# Patient Record
Sex: MALE | Race: ASIAN | NOT HISPANIC OR LATINO | URBAN - METROPOLITAN AREA
[De-identification: names, ages, dates, MRNs, and addresses within clinical notes are randomized per-mention and may not be internally consistent; named-entity substitution may affect disease eponyms.]

---

## 2017-06-11 ENCOUNTER — APPOINTMENT (OUTPATIENT)
Dept: RADIOLOGY | Facility: MEDICAL CENTER | Age: 77
DRG: 197 | End: 2017-06-11
Attending: EMERGENCY MEDICINE
Payer: MEDICARE

## 2017-06-11 ENCOUNTER — HOSPITAL ENCOUNTER (INPATIENT)
Facility: MEDICAL CENTER | Age: 77
LOS: 2 days | DRG: 197 | End: 2017-06-14
Attending: EMERGENCY MEDICINE | Admitting: INTERNAL MEDICINE
Payer: MEDICARE

## 2017-06-11 ENCOUNTER — RESOLUTE PROFESSIONAL BILLING HOSPITAL PROF FEE (OUTPATIENT)
Dept: HOSPITALIST | Facility: MEDICAL CENTER | Age: 77
End: 2017-06-11
Payer: MEDICARE

## 2017-06-11 DIAGNOSIS — J84.9 INTERSTITIAL LUNG DISEASE (HCC): Chronic | ICD-10-CM

## 2017-06-11 LAB
ALBUMIN SERPL BCP-MCNC: 4 G/DL (ref 3.2–4.9)
ALBUMIN/GLOB SERPL: 1.4 G/DL
ALP SERPL-CCNC: 81 U/L (ref 30–99)
ALT SERPL-CCNC: 22 U/L (ref 2–50)
ANION GAP SERPL CALC-SCNC: 7 MMOL/L (ref 0–11.9)
APTT PPP: 36.7 SEC (ref 24.7–36)
AST SERPL-CCNC: 25 U/L (ref 12–45)
BASOPHILS # BLD AUTO: 0.4 % (ref 0–1.8)
BASOPHILS # BLD: 0.02 K/UL (ref 0–0.12)
BILIRUB SERPL-MCNC: 1.4 MG/DL (ref 0.1–1.5)
BNP SERPL-MCNC: 1087 PG/ML (ref 0–100)
BUN SERPL-MCNC: 16 MG/DL (ref 8–22)
CALCIUM SERPL-MCNC: 9.4 MG/DL (ref 8.5–10.5)
CHLORIDE SERPL-SCNC: 104 MMOL/L (ref 96–112)
CO2 SERPL-SCNC: 26 MMOL/L (ref 20–33)
CREAT SERPL-MCNC: 0.89 MG/DL (ref 0.5–1.4)
DEPRECATED D DIMER PPP IA-ACNC: 549 NG/ML(D-DU)
EKG IMPRESSION: NORMAL
EKG IMPRESSION: NORMAL
EOSINOPHIL # BLD AUTO: 0.31 K/UL (ref 0–0.51)
EOSINOPHIL NFR BLD: 6.9 % (ref 0–6.9)
ERYTHROCYTE [DISTWIDTH] IN BLOOD BY AUTOMATED COUNT: 45.5 FL (ref 35.9–50)
GFR SERPL CREATININE-BSD FRML MDRD: >60 ML/MIN/1.73 M 2
GLOBULIN SER CALC-MCNC: 2.9 G/DL (ref 1.9–3.5)
GLUCOSE SERPL-MCNC: 122 MG/DL (ref 65–99)
HCT VFR BLD AUTO: 37.8 % (ref 42–52)
HGB BLD-MCNC: 12.5 G/DL (ref 14–18)
IMM GRANULOCYTES # BLD AUTO: 0.01 K/UL (ref 0–0.11)
IMM GRANULOCYTES NFR BLD AUTO: 0.2 % (ref 0–0.9)
INR PPP: 1.25 (ref 0.87–1.13)
LIPASE SERPL-CCNC: 37 U/L (ref 11–82)
LYMPHOCYTES # BLD AUTO: 1.02 K/UL (ref 1–4.8)
LYMPHOCYTES NFR BLD: 22.6 % (ref 22–41)
MCH RBC QN AUTO: 30.2 PG (ref 27–33)
MCHC RBC AUTO-ENTMCNC: 33.1 G/DL (ref 33.7–35.3)
MCV RBC AUTO: 91.3 FL (ref 81.4–97.8)
MONOCYTES # BLD AUTO: 0.39 K/UL (ref 0–0.85)
MONOCYTES NFR BLD AUTO: 8.6 % (ref 0–13.4)
NEUTROPHILS # BLD AUTO: 2.76 K/UL (ref 1.82–7.42)
NEUTROPHILS NFR BLD: 61.3 % (ref 44–72)
NRBC # BLD AUTO: 0 K/UL
NRBC BLD AUTO-RTO: 0 /100 WBC
PLATELET # BLD AUTO: 102 K/UL (ref 164–446)
PMV BLD AUTO: 10.1 FL (ref 9–12.9)
POTASSIUM SERPL-SCNC: 3.8 MMOL/L (ref 3.6–5.5)
PROCALCITONIN SERPL-MCNC: <0.05 NG/ML
PROT SERPL-MCNC: 6.9 G/DL (ref 6–8.2)
PROTHROMBIN TIME: 16.1 SEC (ref 12–14.6)
RBC # BLD AUTO: 4.14 M/UL (ref 4.7–6.1)
SODIUM SERPL-SCNC: 137 MMOL/L (ref 135–145)
TROPONIN I SERPL-MCNC: 0.03 NG/ML (ref 0–0.04)
TROPONIN I SERPL-MCNC: 0.06 NG/ML (ref 0–0.04)
TROPONIN I SERPL-MCNC: 0.07 NG/ML (ref 0–0.04)
WBC # BLD AUTO: 4.5 K/UL (ref 4.8–10.8)

## 2017-06-11 PROCEDURE — 700111 HCHG RX REV CODE 636 W/ 250 OVERRIDE (IP): Performed by: INTERNAL MEDICINE

## 2017-06-11 PROCEDURE — 71010 DX-CHEST-LIMITED (1 VIEW): CPT

## 2017-06-11 PROCEDURE — 93005 ELECTROCARDIOGRAM TRACING: CPT | Performed by: INTERNAL MEDICINE

## 2017-06-11 PROCEDURE — 85610 PROTHROMBIN TIME: CPT

## 2017-06-11 PROCEDURE — 85379 FIBRIN DEGRADATION QUANT: CPT

## 2017-06-11 PROCEDURE — A9270 NON-COVERED ITEM OR SERVICE: HCPCS | Performed by: INTERNAL MEDICINE

## 2017-06-11 PROCEDURE — 83690 ASSAY OF LIPASE: CPT

## 2017-06-11 PROCEDURE — 80053 COMPREHEN METABOLIC PANEL: CPT

## 2017-06-11 PROCEDURE — 700101 HCHG RX REV CODE 250: Performed by: INTERNAL MEDICINE

## 2017-06-11 PROCEDURE — 36415 COLL VENOUS BLD VENIPUNCTURE: CPT

## 2017-06-11 PROCEDURE — 94760 N-INVAS EAR/PLS OXIMETRY 1: CPT

## 2017-06-11 PROCEDURE — 84145 PROCALCITONIN (PCT): CPT

## 2017-06-11 PROCEDURE — 93306 TTE W/DOPPLER COMPLETE: CPT

## 2017-06-11 PROCEDURE — 99220 PR INITIAL OBSERVATION CARE,LEVL III: CPT | Mod: GC | Performed by: INTERNAL MEDICINE

## 2017-06-11 PROCEDURE — G0378 HOSPITAL OBSERVATION PER HR: HCPCS

## 2017-06-11 PROCEDURE — 84484 ASSAY OF TROPONIN QUANT: CPT

## 2017-06-11 PROCEDURE — 85730 THROMBOPLASTIN TIME PARTIAL: CPT

## 2017-06-11 PROCEDURE — 93005 ELECTROCARDIOGRAM TRACING: CPT

## 2017-06-11 PROCEDURE — 304562 HCHG STAT O2 MASK/CANNULA

## 2017-06-11 PROCEDURE — 700102 HCHG RX REV CODE 250 W/ 637 OVERRIDE(OP): Performed by: INTERNAL MEDICINE

## 2017-06-11 PROCEDURE — 93010 ELECTROCARDIOGRAM REPORT: CPT | Performed by: INTERNAL MEDICINE

## 2017-06-11 PROCEDURE — 93306 TTE W/DOPPLER COMPLETE: CPT | Mod: 26 | Performed by: INTERNAL MEDICINE

## 2017-06-11 PROCEDURE — 304561 HCHG STAT O2

## 2017-06-11 PROCEDURE — 83880 ASSAY OF NATRIURETIC PEPTIDE: CPT

## 2017-06-11 PROCEDURE — 85025 COMPLETE CBC W/AUTO DIFF WBC: CPT

## 2017-06-11 PROCEDURE — 94640 AIRWAY INHALATION TREATMENT: CPT

## 2017-06-11 PROCEDURE — 99285 EMERGENCY DEPT VISIT HI MDM: CPT

## 2017-06-11 PROCEDURE — 93005 ELECTROCARDIOGRAM TRACING: CPT | Performed by: EMERGENCY MEDICINE

## 2017-06-11 RX ORDER — PREDNISONE 20 MG/1
40 TABLET ORAL DAILY
Status: DISCONTINUED | OUTPATIENT
Start: 2017-06-12 | End: 2017-06-13

## 2017-06-11 RX ORDER — PANTOPRAZOLE SODIUM 40 MG/1
40 TABLET, DELAYED RELEASE ORAL DAILY
COMMUNITY

## 2017-06-11 RX ORDER — ACETAMINOPHEN 325 MG/1
650 TABLET ORAL EVERY 6 HOURS PRN
Status: DISCONTINUED | OUTPATIENT
Start: 2017-06-11 | End: 2017-06-14 | Stop reason: HOSPADM

## 2017-06-11 RX ORDER — AMOXICILLIN 250 MG
2 CAPSULE ORAL 2 TIMES DAILY
Status: DISCONTINUED | OUTPATIENT
Start: 2017-06-11 | End: 2017-06-14 | Stop reason: HOSPADM

## 2017-06-11 RX ORDER — IPRATROPIUM BROMIDE AND ALBUTEROL SULFATE 2.5; .5 MG/3ML; MG/3ML
3 SOLUTION RESPIRATORY (INHALATION)
Status: DISCONTINUED | OUTPATIENT
Start: 2017-06-11 | End: 2017-06-11

## 2017-06-11 RX ORDER — CLOPIDOGREL BISULFATE 75 MG/1
75 TABLET ORAL DAILY
COMMUNITY

## 2017-06-11 RX ORDER — POLYETHYLENE GLYCOL 3350 17 G/17G
1 POWDER, FOR SOLUTION ORAL
Status: DISCONTINUED | OUTPATIENT
Start: 2017-06-11 | End: 2017-06-14 | Stop reason: HOSPADM

## 2017-06-11 RX ORDER — BENZONATATE 100 MG/1
100 CAPSULE ORAL 3 TIMES DAILY PRN
Status: DISCONTINUED | OUTPATIENT
Start: 2017-06-11 | End: 2017-06-14 | Stop reason: HOSPADM

## 2017-06-11 RX ORDER — IPRATROPIUM BROMIDE AND ALBUTEROL SULFATE 2.5; .5 MG/3ML; MG/3ML
3 SOLUTION RESPIRATORY (INHALATION) EVERY 4 HOURS
Status: DISCONTINUED | OUTPATIENT
Start: 2017-06-11 | End: 2017-06-12

## 2017-06-11 RX ORDER — CARVEDILOL 6.25 MG/1
6.25 TABLET ORAL DAILY
COMMUNITY

## 2017-06-11 RX ORDER — BISACODYL 10 MG
10 SUPPOSITORY, RECTAL RECTAL
Status: DISCONTINUED | OUTPATIENT
Start: 2017-06-11 | End: 2017-06-14 | Stop reason: HOSPADM

## 2017-06-11 RX ORDER — FLUTICASONE PROPIONATE 110 UG/1
2 AEROSOL, METERED RESPIRATORY (INHALATION)
Status: DISCONTINUED | OUTPATIENT
Start: 2017-06-11 | End: 2017-06-12

## 2017-06-11 RX ORDER — LOSARTAN POTASSIUM 25 MG/1
25 TABLET ORAL DAILY
COMMUNITY

## 2017-06-11 RX ORDER — ATORVASTATIN CALCIUM 80 MG/1
80 TABLET, FILM COATED ORAL NIGHTLY
Status: DISCONTINUED | OUTPATIENT
Start: 2017-06-11 | End: 2017-06-14 | Stop reason: HOSPADM

## 2017-06-11 RX ORDER — FLUTICASONE PROPIONATE 50 MCG
2 SPRAY, SUSPENSION (ML) NASAL DAILY
Status: DISCONTINUED | OUTPATIENT
Start: 2017-06-11 | End: 2017-06-14 | Stop reason: HOSPADM

## 2017-06-11 RX ORDER — ATORVASTATIN CALCIUM 80 MG/1
80 TABLET, FILM COATED ORAL NIGHTLY
COMMUNITY

## 2017-06-11 RX ORDER — ATORVASTATIN CALCIUM 20 MG/1
80 TABLET, FILM COATED ORAL NIGHTLY
COMMUNITY
End: 2017-06-11

## 2017-06-11 RX ORDER — CARVEDILOL 6.25 MG/1
6.25 TABLET ORAL DAILY
Status: DISCONTINUED | OUTPATIENT
Start: 2017-06-11 | End: 2017-06-12

## 2017-06-11 RX ORDER — LOSARTAN POTASSIUM 25 MG/1
25 TABLET ORAL DAILY
Status: DISCONTINUED | OUTPATIENT
Start: 2017-06-11 | End: 2017-06-14 | Stop reason: HOSPADM

## 2017-06-11 RX ORDER — CLOPIDOGREL BISULFATE 75 MG/1
75 TABLET ORAL DAILY
Status: DISCONTINUED | OUTPATIENT
Start: 2017-06-11 | End: 2017-06-14 | Stop reason: HOSPADM

## 2017-06-11 RX ORDER — METHYLPREDNISOLONE ACETATE 40 MG/ML
62.5 INJECTION, SUSPENSION INTRA-ARTICULAR; INTRALESIONAL; INTRAMUSCULAR; SOFT TISSUE ONCE
Status: COMPLETED | OUTPATIENT
Start: 2017-06-11 | End: 2017-06-11

## 2017-06-11 RX ORDER — PANTOPRAZOLE SODIUM 40 MG/1
40 TABLET, DELAYED RELEASE ORAL DAILY
Status: DISCONTINUED | OUTPATIENT
Start: 2017-06-11 | End: 2017-06-11

## 2017-06-11 RX ORDER — OMEPRAZOLE 20 MG/1
20 CAPSULE, DELAYED RELEASE ORAL DAILY
Status: DISCONTINUED | OUTPATIENT
Start: 2017-06-11 | End: 2017-06-14 | Stop reason: HOSPADM

## 2017-06-11 RX ADMIN — ENOXAPARIN SODIUM 40 MG: 100 INJECTION SUBCUTANEOUS at 11:03

## 2017-06-11 RX ADMIN — OMEPRAZOLE 20 MG: 20 CAPSULE, DELAYED RELEASE ORAL at 13:31

## 2017-06-11 RX ADMIN — IPRATROPIUM BROMIDE AND ALBUTEROL SULFATE 3 ML: .5; 3 SOLUTION RESPIRATORY (INHALATION) at 14:48

## 2017-06-11 RX ADMIN — FLUTICASONE PROPIONATE 220 MCG: 110 AEROSOL, METERED RESPIRATORY (INHALATION) at 21:56

## 2017-06-11 RX ADMIN — ATORVASTATIN CALCIUM 80 MG: 80 TABLET, FILM COATED ORAL at 21:38

## 2017-06-11 RX ADMIN — CARVEDILOL 6.25 MG: 6.25 TABLET, FILM COATED ORAL at 13:31

## 2017-06-11 RX ADMIN — CLOPIDOGREL 75 MG: 75 TABLET, FILM COATED ORAL at 13:31

## 2017-06-11 RX ADMIN — LOSARTAN POTASSIUM 25 MG: 25 TABLET, FILM COATED ORAL at 13:31

## 2017-06-11 RX ADMIN — METHYLPREDNISOLONE ACETATE 62 MG: 40 INJECTION, SUSPENSION INTRA-ARTICULAR; INTRALESIONAL; INTRAMUSCULAR; SOFT TISSUE at 15:28

## 2017-06-11 RX ADMIN — IPRATROPIUM BROMIDE AND ALBUTEROL SULFATE 3 ML: .5; 3 SOLUTION RESPIRATORY (INHALATION) at 21:54

## 2017-06-11 ASSESSMENT — ENCOUNTER SYMPTOMS
VOMITING: 0
SHORTNESS OF BREATH: 1
CHILLS: 0
PSYCHIATRIC NEGATIVE: 1
NAUSEA: 0
BLURRED VISION: 0
COUGH: 1
DIAPHORESIS: 0
PALPITATIONS: 0
CONSTIPATION: 0
HEADACHES: 0
DOUBLE VISION: 0
SPUTUM PRODUCTION: 0
DIARRHEA: 0
ABDOMINAL PAIN: 0
MUSCULOSKELETAL NEGATIVE: 1
DIZZINESS: 0
FEVER: 0
ORTHOPNEA: 0
HEMOPTYSIS: 0

## 2017-06-11 ASSESSMENT — LIFESTYLE VARIABLES
ALCOHOL_USE: NO
EVER_SMOKED: YES
DO YOU DRINK ALCOHOL: NO
EVER_SMOKED: YES

## 2017-06-11 ASSESSMENT — COPD QUESTIONNAIRES
DO YOU EVER COUGH UP ANY MUCUS OR PHLEGM?: NO/ONLY WITH OCCASIONAL COLDS OR INFECTIONS
HAVE YOU SMOKED AT LEAST 100 CIGARETTES IN YOUR ENTIRE LIFE: YES
DURING THE PAST 4 WEEKS HOW MUCH DID YOU FEEL SHORT OF BREATH: SOME OF THE TIME
COPD SCREENING SCORE: 6

## 2017-06-11 ASSESSMENT — PAIN SCALES - GENERAL
PAINLEVEL_OUTOF10: 0
PAINLEVEL_OUTOF10: 0

## 2017-06-11 NOTE — IP AVS SNAPSHOT
SpectrumDNA Access Code: [email or web address]  Expires: 7/14/2017 12:08 PM    SpectrumDNA  A secure, online tool to manage your health information     [employer]’s SpectrumDNA® is a secure, online tool that connects you to your personalized health information from the privacy of your home -- day or night - making it very easy for you to manage your healthcare. Once the activation process is completed, you can even access your medical information using the SpectrumDNA rafa, which is available for free in the Apple Rafa store or Google Play store.     SpectrumDNA provides the following levels of access (as shown below):   My Chart Features   [facility] Primary Care Doctor [facility]  Specialists [facility]  Urgent  Care Non-[facility]  Primary Care  Doctor   Email your healthcare team securely and privately 24/7 X X X X   Manage appointments: schedule your next appointment; view details of past/upcoming appointments X      Request prescription refills. X      View recent personal medical records, including lab and immunizations X X X X   View health record, including health history, allergies, medications X X X X   Read reports about your outpatient visits, procedures, consult and ER notes X X X X   See your discharge summary, which is a recap of your hospital and/or ER visit that includes your diagnosis, lab results, and care plan. X X       How to register for SpectrumDNA:  1. Go to  [email or web address].  2. Click on the Sign Up Now box, which takes you to the New Member Sign Up page. You will need to provide the following information:  a. Enter your SpectrumDNA Access Code exactly as it appears at the top of this page. (You will not need to use this code after you’ve completed the sign-up process. If you do not sign up before the expiration date, you must request a new code.)   b. Enter your date of birth.   c. Enter your home email address.   d. Click Submit, and follow the next screen’s instructions.  3. Create a SpectrumDNA ID. This will be your SpectrumDNA  login ID and cannot be changed, so think of one that is secure and easy to remember.  4. Create a [employer] password. You can change your password at any time.  5. Enter your Password Reset Question and Answer. This can be used at a later time if you forget your password.   6. Enter your e-mail address. This allows you to receive e-mail notifications when new information is available in [employer].  7. Click Sign Up. You can now view your health information.    For assistance activating your [employer] account, call [phone]

## 2017-06-11 NOTE — PROGRESS NOTES
Received report from RN in ER. Patient is up in walked from [facility] to [facility]. Standing weight done. He is awake and alert with no chest pain or shortness of breath noted. Informed of safety and call system. In a sinus rhythm.

## 2017-06-11 NOTE — IP AVS SNAPSHOT
" Home Care Instructions                                                                                                                  Name:Navi Morelos  Medical Record Number:[id number]  CSN: [id number]    YOB: 1940   Age: 77 y.o.  Sex: male  HT:1.676 m (5' 5.98\") WT: 69.1 kg (152 lb 5.4 oz)          Admit Date: 6/11/2017     Discharge Date:   Today's Date: 6/14/2017  Attending Doctor:  Michael Thomas M.D.                  Allergies:  Bactrim ds and Sulfa drugs            Discharge Instructions       Discharge Instructions    Discharged to home by car with relative. Discharged via wheelchair, hospital escort: Yes.  Special equipment needed: Oxygen    Be sure to schedule a follow-up appointment with your primary care doctor or any specialists as instructed.     Discharge Plan:   Influenza Vaccine Indication: Not indicated: Previously immunized this influenza season and > 8 years of age    I understand that a diet low in cholesterol, fat, and sodium is recommended for good health. Unless I have been given specific instructions below for another diet, I accept this instruction as my diet prescription.   Other diet:     Special Instructions: None    · Is patient discharged on Warfarin / Coumadin?   No     · Is patient Post Blood Transfusion?  No    Depression / Suicide Risk    As you are discharged from this [facility] facility, it is important to learn how to keep safe from harming yourself.    Recognize the warning signs:  · Abrupt changes in personality, positive or negative- including increase in energy   · Giving away possessions  · Change in eating patterns- significant weight changes-  positive or negative  · Change in sleeping patterns- unable to sleep or sleeping all the time   · Unwillingness or inability to communicate  · Depression  · Unusual sadness, discouragement and loneliness  · Talk of wanting to die  · Neglect of personal appearance   · Rebelliousness- reckless behavior  · Withdrawal from " people/activities they love  · Confusion- inability to concentrate     If you or a loved one observes any of these behaviors or has concerns about self-harm, here's what you can do:  · Talk about it- your feelings and reasons for harming yourself  · Remove any means that you might use to hurt yourself (examples: pills, rope, extension cords, firearm)  · Get professional help from the community (Mental Health, Substance Abuse, psychological counseling)  · Do not be alone:Call your Safe Contact- someone whom you trust who will be there for you.  · Call your local CRISIS HOTLINE 373-4297 or 159-120-8354  · Call your local Children's Mobile Crisis Response Team [place] (932) 997-5396 or www.iRates  · Call the toll free National Suicide Prevention Hotlines   · National Suicide Prevention Lifeline 694-966-NYYR (5777)  · [employer] Line Network 800-SUICIDE (665-0337)    Heart Failure    Heart failure means your heart has trouble pumping blood. This makes it hard for your body to work well. Heart failure is usually a long-term (chronic) condition. You must take good care of yourself and follow your doctor's treatment plan.    HOME CARE  · Take your heart medicine as told by your doctor.  ¨ Do not stop taking medicine unless your doctor tells you to.  ¨ Do not skip any dose of medicine.  ¨ Refill your medicines before they run out.  ¨ Take other medicines only as told by your doctor or pharmacist.  · Stay active if told by your doctor. The elderly and people with severe heart failure should talk with a doctor about physical activity.  · Eat heart-healthy foods. Choose foods that are without trans fat and are low in saturated fat, cholesterol, and salt (sodium). This includes fresh or frozen fruits and vegetables, fish, lean meats, fat-free or low-fat dairy foods, whole grains, and high-fiber foods. Lentils and dried peas and beans (legumes) are also good choices.  · Limit salt if told by your  doctor.  · Cook in a healthy way. Roast, grill, broil, bake, poach, steam, or stir-thurston foods.  · Limit fluids as told by your doctor.  · Weigh yourself every morning. Do this after you pee (urinate) and before you eat breakfast. Write down your weight to give to your doctor.  · Take your blood pressure and write it down if your doctor tells you to.  · Ask your doctor how to check your pulse. Check your pulse as told.  · Lose weight if told by your doctor.  · Stop smoking or chewing tobacco. Do not use gum or patches that help you quit without your doctor's approval.  · Schedule and go to doctor visits as told.  · Nonpregnant women should have no more than 1 drink a day. Men should have no more than 2 drinks a day. Talk to your doctor about drinking alcohol.  · Stop illegal drug use.  · Stay current with shots (immunizations).  · Manage your health conditions as told by your doctor.  · Learn to manage your stress.  · Rest when you are tired.  · If it is really hot outside:  ¨ Avoid intense activities.  ¨ Use air conditioning or fans, or get in a cooler place.  ¨ Avoid caffeine and alcohol.  ¨ Wear loose-fitting, lightweight, and light-colored clothing.  · If it is really cold outside:  ¨ Avoid intense activities.  ¨ Layer your clothing.  ¨ Wear mittens or gloves, a hat, and a scarf when going outside.  ¨ Avoid alcohol.  · Learn about heart failure and get support as needed.  · Get help to maintain or improve your quality of life and your ability to care for yourself as needed.  GET HELP IF:   · You gain weight quickly.  · You are more short of breath than usual.  · You cannot do your normal activities.  · You tire easily.  · You cough more than normal, especially with activity.  · You have any or more puffiness (swelling) in areas such as your hands, feet, ankles, or belly (abdomen).  · You cannot sleep because it is hard to breathe.  · You feel like your heart is beating fast (palpitations).  · You get dizzy or  light-headed when you stand up.  GET HELP RIGHT AWAY IF:   · You have trouble breathing.  · There is a change in mental status, such as becoming less alert or not being able to focus.  · You have chest pain or discomfort.  · You faint.  MAKE SURE YOU:   · Understand these instructions.  · Will watch your condition.  · Will get help right away if you are not doing well or get worse.     This information is not intended to replace advice given to you by your health care provider. Make sure you discuss any questions you have with your health care provider.     Document Released: 09/26/2009 Document Revised: 01/08/2016 Document Reviewed: 02/03/2014  [employer] Interactive Patient Education ©2016 [employer]        Follow-up Information     1. Follow up with Julia Marks DO (This is an associate of your PCP). Go on 6/15/2017.    Why:  Your appointment time is at 11:40am, however it is requested that you arrive 15 minutes early for parking and registration    Contact information    [address] [place] [phone]         Discharge Medication Instructions:    Below are the medications your physician expects you to take upon discharge:    Review all your home medications and newly ordered medications with your doctor and/or pharmacist. Follow medication instructions as directed by your doctor and/or pharmacist.    Please keep your medication list with you and share with your physician.               Medication List      START taking these medications        Instructions    Morning Afternoon Evening Bedtime    benzonatate 100 MG Caps   Commonly known as:  TESSALON   Next Dose Due:  As needed for cough          Take 1 Cap by mouth 3 times a day as needed for Cough.   Dose:  100 mg                        doxycycline 100 MG Tabs   Commonly known as:  VIBRAMYCIN   Next Dose Due:  This evening          Take 1 Tab by mouth 2 times a day for 5 days.   Dose:  100 mg                         predniSONE 10 MG Tabs   Last time this was given:  30 mg on 6/14/2017  8:20 AM   Commonly known as:  DELTASONE   Next Dose Due:  Take two tabs tomorrow morning 6/15        Take 1 Tab by mouth every day for 5 days. On 6/14 take 3 tabs On 6/15 take 2 tabs On 6/16 take 1 tab On 6/17 take 1/2 tab On 6/18 take 1/2 tab   Dose:  10 mg                          CONTINUE taking these medications        Instructions    Morning Afternoon Evening Bedtime    atorvastatin 80 MG tablet   Last time this was given:  80 mg on 6/13/2017  8:40 PM   Commonly known as:  LIPITOR   Next Dose Due:  This evening          Take 80 mg by mouth every evening.   Dose:  80 mg                        carvedilol 6.25 MG Tabs   Last time this was given:  3.125 mg on 6/14/2017  8:20 AM   Commonly known as:  COREG   Next Dose Due:  1/2 tab this evening        Take 6.25 mg by mouth every day. Take 1/2 tab in am and 1/2 tab at night   Dose:  6.25 mg                        clopidogrel 75 MG Tabs   Last time this was given:  75 mg on 6/14/2017  8:20 AM   Commonly known as:  PLAVIX   Next Dose Due:  Tomorrow morning        Take 75 mg by mouth every day.   Dose:  75 mg                        losartan 25 MG Tabs   Last time this was given:  25 mg on 6/14/2017  8:20 AM   Commonly known as:  COZAAR   Next Dose Due:  Tomorrow morning        Take 25 mg by mouth every day.   Dose:  25 mg                        pantoprazole 40 MG Tbec   Commonly known as:  PROTONIX   Next Dose Due:  Tomorrow morning        Take 40 mg by mouth every day.   Dose:  40 mg                             Where to Get Your Medications      Information about where to get these medications is not yet available     ! Ask your nurse or doctor about these medications    - benzonatate 100 MG Caps  - doxycycline 100 MG Tabs  - predniSONE 10 MG Tabs            Orders for after discharge     DME O2 New Set Up    Complete by:  As directed              Instructions           Diet / Nutrition:    Follow  any diet instructions given to you by your doctor or the dietician, including how much salt (sodium) you are allowed each day.    If you are overweight, talk to your doctor about a weight reduction plan.    Activity:    Remain physically active following your doctor's instructions about exercise and activity.    Rest often.     Any time you become even a little tired or short of breath, SIT DOWN and rest.    Worsening Symptoms:    Report any of the following signs and symptoms to the doctor's office immediately:    *Pain of jaw, arm, or neck  *Chest pain not relieved by medication                               *Dizziness or loss of consciousness  *Difficulty breathing even when at rest   *More tired than usual                                       *Bleeding drainage or swelling of surgical site  *Swelling of feet, ankles, legs or stomach                 *Fever (>100ºF)  *Pink or blood tinged sputum  *Weight gain (3lbs/day or 5lbs /week)           *Shock from internal defibrillator (if applicable)  *Palpitations or irregular heartbeats                *Cool and/or numb extremities    Stroke Awareness    Common Risk Factors for Stroke include:    Age  Atrial Fibrillation  Carotid Artery Stenosis  Diabetes Mellitus  Excessive alcohol consumption  High blood pressure  Overweight   Physical inactivity  Smoking    Warning signs and symptoms of a stroke include:    *Sudden numbness or weakness of the face, arm or leg (especially on one side of the body).  *Sudden confusion, trouble speaking or understanding.  *Sudden trouble seeing in one or both eyes.  *Sudden trouble walking, dizziness, loss of balance or coordination.Sudden severe headache with no known cause.    It is very important to get treatment quickly when a stroke occurs. If you experience any of the above warning signs, call 911 immediately.                   Disclaimer         Quit Smoking / Tobacco Use:    I understand the use of any tobacco products increases  my chance of suffering from future heart disease or stroke and could cause other illnesses which may shorten my life. Quitting the use of tobacco products is the single most important thing I can do to improve my health. For further information on smoking / tobacco cessation call a Toll Free Quit Line at 1-107.744.5482 (*National Cancer [place]) or 1-350.153.6255 (American Lung Association) or you can access the web based program at www.lungusa.org.    [place] Tobacco Users Help Line:  (147) 754-5249       Toll Free: 1-914.542.1906  Quit Tobacco Program [facility] (583)736-7008    Crisis Hotline:    [place] Crisis Hotline:  3-483-HRSMAOH or 1-288.977.9011    [place] Crisis Hotline:    1-932.233.9271 or 705-225-4555    Discharge Survey:   Thank you for choosing [facility]. We hope we did everything we could to make your hospital stay a pleasant one. You may be receiving a phone survey and we would appreciate your time and participation in answering the questions. Your input is very valuable to us in our efforts to improve our service to our patients and their families.        My signature on this form indicates that:    1. I have reviewed and understand the above information.  2. My questions regarding this information have been answered to my satisfaction.  3. I have formulated a plan with my discharge nurse to obtain my prescribed medications for home.                  Disclaimer         __________________________________                     __________       ________                       Patient Signature                                                 Date                    Time

## 2017-06-11 NOTE — ED PROVIDER NOTES
".  ED Provider Note    Scribed for Ovidio Saunders D.O. by Jessy Kulkarni. 6/11/2017  6:24 AM    Primary care provider: No primary care provider on file.  Means of arrival: EMS  History obtained from: Patient   History limited by: None    CHIEF COMPLAINT  Chief Complaint   Patient presents with   • Chest Pain     descibed as tightness that started after arrriving in [place] from [place]   • Shortness of Breath     exsertional SOB   • Cough     dry cough       HPI  Navi Morelos is a 77 y.o. male who presents to the Emergency Department for shortness of breath, onset last night and worsening this morning. Ambulating exacerbates his shortness of breath. The patient also states his chest is \"tight\" but denies any chest pain. He also has a dry cough associated.  He denies any leg swelling or fever associated.The patient is currently visiting [place] from [place]. The patient has history of AAA, bronchitises, and CHF.      REVIEW OF SYSTEMS  Pertinent positives include shortness of breath, dry cough, chest pressure. Pertinent negatives include no chest pain, leg swelling, fever.  All other systems reviewed and negative.  C.    PAST MEDICAL HISTORY  Past Medical History   Diagnosis Date   • Hypertension    • MI (myocardial infarction) (CMS-HCC)    • Aortic aneurysm (CMS-HCC)      abdominal       SURGICAL HISTORY  Past Surgical History   Procedure Laterality Date   • Other cardiac surgery          SOCIAL HISTORY  Social History   Substance Use Topics   • Smoking status: Former Smoker   • Smokeless tobacco: None   • Alcohol Use: No      History   Drug Use No       FAMILY HISTORY  History reviewed. No pertinent family history.    CURRENT MEDICATIONS  No current facility-administered medications on file prior to encounter.     No current outpatient prescriptions on file prior to encounter.       ALLERGIES  Allergies   Allergen Reactions   • Bactrim Ds    • Sulfa Drugs        PHYSICAL EXAM  VITAL SIGNS: /84 " "mmHg  Pulse 93  Temp(Src) 37.1 °C (98.8 °F)  Resp 18  Ht 1.676 m (5' 6\")  Wt 68.947 kg (152 lb)  BMI 24.55 kg/m2  SpO2 96%    Nursing notes and vitals reviewed.  Constitutional: Well developed, Well nourished, No acute distress, Non-toxic appearance. Nasal canula in place.  Eyes: PERRLA, EOMI, Conjunctiva normal, No discharge.   Cardiovascular: Normal heart rate, Normal rhythm, No murmurs, No rubs, No gallops.   Thorax & Lungs: No respiratory distress, No wheezing, No chest tenderness. Rales and rhonchi bilaterally.  Abdomen: Bowel sounds normal, Soft, No tenderness, No guarding, No rebound, No masses, No pulsatile masses.   Skin: Warm, Dry, No erythema, No rash.   Musculoskeletal: Intact distal pulses, No edema, No cyanosis, No clubbing. Good range of motion in all major joints. No tenderness to palpation or major deformities noted, no CVA tenderness, no midline back tenderness.   Neurologic: Alert & oriented x 3, Normal motor function, Normal sensory function, No focal deficits noted.  Psychiatric: Affect normal for clinical presentation.    DIAGNOSTIC STUDIES/PROCEDURES    LABS  Results for orders placed or performed during the hospital encounter of 06/11/17   Troponin   Result Value Ref Range    Troponin I 0.03 0.00 - 0.04 ng/mL   Btype Natriuretic Peptide   Result Value Ref Range    B Natriuretic Peptide 1087 (H) 0 - 100 pg/mL   CBC with Differential   Result Value Ref Range    WBC 4.5 (L) 4.8 - 10.8 K/uL    RBC 4.14 (L) 4.70 - 6.10 M/uL    Hemoglobin 12.5 (L) 14.0 - 18.0 g/dL    Hematocrit 37.8 (L) 42.0 - 52.0 %    MCV 91.3 81.4 - 97.8 fL    MCH 30.2 27.0 - 33.0 pg    MCHC 33.1 (L) 33.7 - 35.3 g/dL    RDW 45.5 35.9 - 50.0 fL    Platelet Count 102 (L) 164 - 446 K/uL    MPV 10.1 9.0 - 12.9 fL    Neutrophils-Polys 61.30 44.00 - 72.00 %    Lymphocytes 22.60 22.00 - 41.00 %    Monocytes 8.60 0.00 - 13.40 %    Eosinophils 6.90 0.00 - 6.90 %    Basophils 0.40 0.00 - 1.80 %    Immature Granulocytes 0.20 0.00 - " 0.90 %    Nucleated RBC 0.00 /100 WBC    Neutrophils (Absolute) 2.76 1.82 - 7.42 K/uL    Lymphs (Absolute) 1.02 1.00 - 4.80 K/uL    Monos (Absolute) 0.39 0.00 - 0.85 K/uL    Eos (Absolute) 0.31 0.00 - 0.51 K/uL    Baso (Absolute) 0.02 0.00 - 0.12 K/uL    Immature Granulocytes (abs) 0.01 0.00 - 0.11 K/uL    NRBC (Absolute) 0.00 K/uL   Complete Metabolic Panel (CMP)   Result Value Ref Range    Sodium 137 135 - 145 mmol/L    Potassium 3.8 3.6 - 5.5 mmol/L    Chloride 104 96 - 112 mmol/L    Co2 26 20 - 33 mmol/L    Anion Gap 7.0 0.0 - 11.9    Glucose 122 (H) 65 - 99 mg/dL    Bun 16 8 - 22 mg/dL    Creatinine 0.89 0.50 - 1.40 mg/dL    Calcium 9.4 8.5 - 10.5 mg/dL    AST(SGOT) 25 12 - 45 U/L    ALT(SGPT) 22 2 - 50 U/L    Alkaline Phosphatase 81 30 - 99 U/L    Total Bilirubin 1.4 0.1 - 1.5 mg/dL    Albumin 4.0 3.2 - 4.9 g/dL    Total Protein 6.9 6.0 - 8.2 g/dL    Globulin 2.9 1.9 - 3.5 g/dL    A-G Ratio 1.4 g/dL   Prothrombin Time   Result Value Ref Range    PT 16.1 (H) 12.0 - 14.6 sec    INR 1.25 (H) 0.87 - 1.13   APTT   Result Value Ref Range    APTT 36.7 (H) 24.7 - 36.0 sec   Lipase   Result Value Ref Range    Lipase 37 11 - 82 U/L   ESTIMATED GFR   Result Value Ref Range    GFR If African American >60 >60 mL/min/1.73 m 2    GFR If Non African American >60 >60 mL/min/1.73 m 2   EKG (ER)   Result Value Ref Range    Report       [facility] Emergency Dept.    Test Date:  2017  Pt Name:    JOVITA BEST                  Department: ER  MRN:        [id number]                      Room:        09  Gender:     M                            Technician: [id number]  :        1940                   Requested By:ER TRIAGE PROTOCOL  Order #:    [id number]                    Reading MD: GINA AMBRIZ, DO    Measurements  Intervals                                Axis  Rate:       95                           P:          62  NY:         192                          QRS:        -15  QRSD:       82                            T:          121  QT:         368  QTc:        463    Interpretive Statements  SINUS RHYTHM  EARLY PRECORDIAL R/S TRANSITION  LVH WITH SECONDARY REPOLARIZATION ABNORMALITY  INFERIOR INFARCT, OLD  No previous ECG available for comparison    Electronically Signed On 6- 6:30:17 PDT by GINA AMBRIZ, DO         All labs reviewed by me.      RADIOLOGY  DX-CHEST-LIMITED (1 VIEW)   Final Result      Interstitial prominence may represent chronic fibrotic changes. Superimposed edema or pneumonitis not excluded.      Cardiomegaly.        The radiologist's interpretation of all radiological studies have been reviewed by me.    COURSE & MEDICAL DECISION MAKING  Pertinent Labs & Imaging studies reviewed. (See chart for details)    6:24 AM - Patient seen and examined at bedside. Ordered DX-chest, Troponin, BNP, CBC with differential, CMP, Prothrombin Time, APTT, Lipase, Estimated GFR, EKG to evaluate his symptoms.     7:40 AM Paged R Internal Medicine.    7:47 AM - I discussed the patient's case and the above findings with [facility] who agrees to admit the patient.     8:12 AM Recheck: Patient re-evaluated at beside.  Discussed patient's condition and treatment plan. Patient's lab and radiology results discussed. The patient understood and is in agreement for admission.       This is a charming 77 y.o. male that presents with shortness of breath. As concern for possible CHF therefore evaluation was completed. The patient does have an elevated BNP of 1087 as well as intermittent tachycardia. At this point, the patient will be admitted for further evaluation and management of his congestive heart failure acute exacerbation. The patient is a very difficult historian yet I do not believe he has a history of congestive heart failure in the past. The patient has not severe hypoxia and do not believe is expressing a pulmonary embolism, aortic dissection, aortic aneurysm. EKG reveals no  evidence of ST elevation myocardial infarction.      DISPOSITION:  Patient will be admitted to [facility] Internal in guarded condition.       FINAL IMPRESSION  Congestive heart failure  Shortness of breath   IJessy (Scribe), am scribing for, and in the presence of, Ovidio Saunders D.O    Electronically signed by: Jessy Kulkarni (Scribe), 6/11/2017    IOvidio D.O. personally performed the services described in this documentation, as scribed by Jessy Kulkarni in my presence, and it is both accurate and complete.    The note accurately reflects work and decisions made by me.  Ovidio Saunders  6/11/2017  9:46 AM

## 2017-06-11 NOTE — IP AVS SNAPSHOT
" <p align=\"LEFT\"><IMG SRC=\"//[email or web address]$/[email or web address]\" alt=\"Image\" WIDTH=\"50%\" HEIGHT=\"200\" BORDER=\"\"></p>                   Name:Navi Morelos  Medical Record Number:[id number]  CSN: [id number]    YOB: 1940   Age: 77 y.o.  Sex: male  HT:1.676 m (5' 5.98\") WT: 69.1 kg (152 lb 5.4 oz)          Admit Date: 6/11/2017     Discharge Date:   Today's Date: 6/14/2017  Attending Doctor:  Michael Thomas M.D.                  Allergies:  Bactrim ds and Sulfa drugs          Follow-up Information     1. Follow up with Julia Marks DO (This is an associate of your PCP). Go on 6/15/2017.    Why:  Your appointment time is at 11:40am, however it is requested that you arrive 15 minutes early for parking and registration    Contact information    [address] [place], [place] [address] [phone]         Medication List      Take these Medications        Instructions    atorvastatin 80 MG tablet   Commonly known as:  LIPITOR    Take 80 mg by mouth every evening.   Dose:  80 mg       benzonatate 100 MG Caps   Commonly known as:  TESSALON    Take 1 Cap by mouth 3 times a day as needed for Cough.   Dose:  100 mg       carvedilol 6.25 MG Tabs   Commonly known as:  COREG    Take 6.25 mg by mouth every day. Take 1/2 tab in am and 1/2 tab at night   Dose:  6.25 mg       clopidogrel 75 MG Tabs   Commonly known as:  PLAVIX    Take 75 mg by mouth every day.   Dose:  75 mg       doxycycline 100 MG Tabs   Commonly known as:  VIBRAMYCIN    Take 1 Tab by mouth 2 times a day for 5 days.   Dose:  100 mg       losartan 25 MG Tabs   Commonly known as:  COZAAR    Take 25 mg by mouth every day.   Dose:  25 mg       pantoprazole 40 MG Tbec   Commonly known as:  PROTONIX    Take 40 mg by mouth every day.   Dose:  40 mg       predniSONE 10 MG Tabs   Commonly known as:  DELTASONE    Take 1 Tab by mouth every day for 5 days. On 6/14 take 3 tabs On 6/15 take 2 tabs On 6/16 take 1 tab On 6/17 take 1/2 tab On 6/18 take 1/2 tab   "   Dose:  10 mg

## 2017-06-11 NOTE — ASSESSMENT & PLAN NOTE
-Per patient's son, patient had history of GI bleed in past and was put on Protonix since then  -Stable, no evidence of acute bleed, no melena  -Continue omeprazole 20mg qd while here

## 2017-06-11 NOTE — ED NOTES
"Wife at bedside. She states pt has chronic \"lung problems\" but unsure what the condition is called. Pt has a regular pulmonologist at home.   "

## 2017-06-11 NOTE — ASSESSMENT & PLAN NOTE
-h/o multiple MIs, s/p 2 stents in 2013  -Troponin 0.03->0.06-->0.07, mild increase likely secondary to demand ischemia.  -ECG: Sinus, , old inferior infarct, LVH, no ST/T changes. Repeat ECG x 2 no changes.  -home meds: clopidogrel 75mg, coreg 6.25mg, lipitor 80mg. Continue here

## 2017-06-11 NOTE — H&P
"       [facility] Admitting History and Physical    Name Navi Morelos 1940   Age/Sex 77 y.o. male   MRN [id number]   Code Status Full Code     After 5PM or if no immediate response to page, please call for cross-coverage  Attending/Team: Dr. Grubbs/Jori Call [phone] to page   1st Call - Day Intern (R1):   Dr. Perdomo 2nd Call - Day Sr. Resident (R2/R3):   Dr. Uribe       Chief Complaint:  SOB, chest tightness, cough    HPI:  77y M with PMH of HTN, CAD with last MI 2 years ago, chronic bronchiectasis/ILD presents today with \"chest tightness\", dry cough, and SOB. Patient is visiting from [place], primarily [place] speaking so history is somewhat limited with the language barrier. States he arrived in [place] on Friday and has [place]. He started to develop a dry cough, believes it is due to the smoke exposure in the casino. Last night he developed a chest tightness, pointing to the substernal/midepigastric region, with associated SOB. Pain is non radiating, denies nausea, vomiting, diaphoresis, palpitations, leg swelling, orthopnea. Denies recent illness or changes in medications.    ED Course:  CXR, troponin, ECG, lipase, and basic labs were evaluated.  No meds started.     Review of Systems   Constitutional: Negative for fever, chills, malaise/fatigue and diaphoresis.   HENT: Negative for hearing loss.    Eyes: Negative for blurred vision and double vision.   Respiratory: Positive for cough and shortness of breath. Negative for hemoptysis and sputum production.    Cardiovascular: Negative for chest pain, palpitations, orthopnea and leg swelling.        Described as \"chest tightness\"   Gastrointestinal: Negative for nausea, vomiting, abdominal pain, diarrhea and constipation.   Genitourinary: Negative for dysuria, urgency, frequency and hematuria.   Musculoskeletal: Negative.    Neurological: Negative for dizziness and headaches. "   Endo/Heme/Allergies: Negative.    Psychiatric/Behavioral: Negative.              Past Medical History:   Past Medical History   Diagnosis Date   • Hypertension    • MI (myocardial infarction) (CMS-HCC)    • Aortic aneurysm (CMS-HCC)      abdominal   Chronic bronchiectasis, Interstitial Lung Disease    Past Surgical History:  Past Surgical History   Procedure Laterality Date   • Other cardiac surgery     AAA repair 2012    Current Outpatient Medications:  Home Medications     Reviewed by Dane Garrison (Pharmacy Tech) on 06/11/17 at 0748  Med List Status: Complete    Medication Last Dose Status    atorvastatin (LIPITOR) 80 MG tablet 6/10/2017 Active    carvedilol (COREG) 6.25 MG Tab 6/10/2017 Active    clopidogrel (PLAVIX) 75 MG Tab 6/10/2017 Active    losartan (COZAAR) 25 MG Tab 6/10/2017 Active    pantoprazole (PROTONIX) 40 MG Tablet Delayed Response 6/10/2017 Active                Medication Allergy/Sensitivities:  Allergies   Allergen Reactions   • Bactrim Ds    • Sulfa Drugs    -Sulfa drugs: rash      Family History:  History reviewed. No pertinent family history.    Social History:  Social History     Social History   • Marital Status:      Spouse Name: N/A   • Number of Children: N/A   • Years of Education: N/A     Occupational History   • Not on file.     Social History Main Topics   • Smoking status: Former Smoker   • Smokeless tobacco: Not on file   • Alcohol Use: No   • Drug Use: No   • Sexual Activity: Not on file     Other Topics Concern   • Not on file     Social History Narrative   • No narrative on file     Social hx: 20 pack year history, quit 30 years ago    Living situation: Lives in [place] with wife.  PCP : established with [facility]      Physical Exam     Filed Vitals:    06/11/17 0915 06/11/17 0933 06/11/17 1310 06/11/17 1450   BP:  117/77 116/73    Pulse: 95 93 83 75   Temp:  37.3 °C (99.2 °F) 37.2 °C (98.9 °F)    Resp: 20 19  16   Height:       Weight:       SpO2:  "90% 92% 93% 97%     Body mass index is 25.1 kg/(m^2).  /73 mmHg  Pulse 75  Temp(Src) 37.2 °C (98.9 °F)  Resp 16  Ht 1.676 m (5' 5.98\")  Wt 70.5 kg (155 lb 6.8 oz)  BMI 25.10 kg/m2  SpO2 97%  O2 therapy: Pulse Oximetry: 97 %, O2 (LPM): 2, O2 Delivery: Silicone Nasal Cannula    Physical Exam   Constitutional: He is oriented to person, place, and time and well-developed, well-nourished, and in no distress.   HENT:   Head: Normocephalic and atraumatic.   Mouth/Throat: Oropharynx is clear and moist.   Eyes: EOM are normal.   Neck: Normal range of motion. JVD present.   Cardiovascular: Normal rate and regular rhythm.  Exam reveals no friction rub.    No murmur heard.  Split S2 heard best in LSB.   Pulmonary/Chest: Effort normal. No respiratory distress. He has no wheezes. He has rales.   Bilateral crackles heard throughout, loudest in the bibasilar areas.   Abdominal: Soft. Bowel sounds are normal. He exhibits no distension and no mass. There is no tenderness. There is no guarding.   Musculoskeletal: Normal range of motion. He exhibits no edema.   Neurological: He is alert and oriented to person, place, and time.   Skin: Skin is warm and dry. He is not diaphoretic. No erythema.   Psychiatric: Affect normal.             Data Review       Old Records Request:   Completed  Current Records review and summary: Completed    Lab Data Review:  Recent Results (from the past 24 hour(s))   EKG (ER)    Collection Time: 17  6:07 AM   Result Value Ref Range    Report       [facility] Dept[facility]    Test Date:  2017  Pt Name:    JOVITA BEST                  Department: ER  MRN:        [id number]                      Room:       RD 09  Gender:     M                            Technician: [id number]  :        1940                   Requested By:ER TRIAGE PROTOCOL  Order #:    [id number]                    Mitch MD: GINA AMBRIZ, DO    Measurements  Intervals                        "         Axis  Rate:       95                           P:          62  ND:         192                          QRS:        -15  QRSD:       82                           T:          121  QT:         368  QTc:        463    Interpretive Statements  SINUS RHYTHM  EARLY PRECORDIAL R/S TRANSITION  LVH WITH SECONDARY REPOLARIZATION ABNORMALITY  INFERIOR INFARCT, OLD  No previous ECG available for comparison    Electronically Signed On 6- 6:30:17 PDT by GINA AMBRIZ DO     Troponin    Collection Time: 06/11/17  6:20 AM   Result Value Ref Range    Troponin I 0.03 0.00 - 0.04 ng/mL   Btype Natriuretic Peptide    Collection Time: 06/11/17  6:20 AM   Result Value Ref Range    B Natriuretic Peptide 1087 (H) 0 - 100 pg/mL   CBC with Differential    Collection Time: 06/11/17  6:20 AM   Result Value Ref Range    WBC 4.5 (L) 4.8 - 10.8 K/uL    RBC 4.14 (L) 4.70 - 6.10 M/uL    Hemoglobin 12.5 (L) 14.0 - 18.0 g/dL    Hematocrit 37.8 (L) 42.0 - 52.0 %    MCV 91.3 81.4 - 97.8 fL    MCH 30.2 27.0 - 33.0 pg    MCHC 33.1 (L) 33.7 - 35.3 g/dL    RDW 45.5 35.9 - 50.0 fL    Platelet Count 102 (L) 164 - 446 K/uL    MPV 10.1 9.0 - 12.9 fL    Neutrophils-Polys 61.30 44.00 - 72.00 %    Lymphocytes 22.60 22.00 - 41.00 %    Monocytes 8.60 0.00 - 13.40 %    Eosinophils 6.90 0.00 - 6.90 %    Basophils 0.40 0.00 - 1.80 %    Immature Granulocytes 0.20 0.00 - 0.90 %    Nucleated RBC 0.00 /100 WBC    Neutrophils (Absolute) 2.76 1.82 - 7.42 K/uL    Lymphs (Absolute) 1.02 1.00 - 4.80 K/uL    Monos (Absolute) 0.39 0.00 - 0.85 K/uL    Eos (Absolute) 0.31 0.00 - 0.51 K/uL    Baso (Absolute) 0.02 0.00 - 0.12 K/uL    Immature Granulocytes (abs) 0.01 0.00 - 0.11 K/uL    NRBC (Absolute) 0.00 K/uL   Complete Metabolic Panel (CMP)    Collection Time: 06/11/17  6:20 AM   Result Value Ref Range    Sodium 137 135 - 145 mmol/L    Potassium 3.8 3.6 - 5.5 mmol/L    Chloride 104 96 - 112 mmol/L    Co2 26 20 - 33 mmol/L    Anion Gap 7.0 0.0 - 11.9     Glucose 122 (H) 65 - 99 mg/dL    Bun 16 8 - 22 mg/dL    Creatinine 0.89 0.50 - 1.40 mg/dL    Calcium 9.4 8.5 - 10.5 mg/dL    AST(SGOT) 25 12 - 45 U/L    ALT(SGPT) 22 2 - 50 U/L    Alkaline Phosphatase 81 30 - 99 U/L    Total Bilirubin 1.4 0.1 - 1.5 mg/dL    Albumin 4.0 3.2 - 4.9 g/dL    Total Protein 6.9 6.0 - 8.2 g/dL    Globulin 2.9 1.9 - 3.5 g/dL    A-G Ratio 1.4 g/dL   Prothrombin Time    Collection Time: 17  6:20 AM   Result Value Ref Range    PT 16.1 (H) 12.0 - 14.6 sec    INR 1.25 (H) 0.87 - 1.13   APTT    Collection Time: 17  6:20 AM   Result Value Ref Range    APTT 36.7 (H) 24.7 - 36.0 sec   Lipase    Collection Time: 17  6:20 AM   Result Value Ref Range    Lipase 37 11 - 82 U/L   ESTIMATED GFR    Collection Time: 17  6:20 AM   Result Value Ref Range    GFR If African American >60 >60 mL/min/1.73 m 2    GFR If Non African American >60 >60 mL/min/1.73 m 2   D-DIMER    Collection Time: 17  6:20 AM   Result Value Ref Range    D-Dimer Screen 549 (H) <250 ng/mL(D-DU)   EKG (ER)    Collection Time: 17 12:07 PM   Result Value Ref Range    Report       [facility]    Test Date:  2017  Pt Name:    JOVITA BEST                  Department: ER  MRN:        [id number]                      Room:       T734  Gender:     M                            Technician: PAULINE  :        1940                   Requested By:GINA AMBRIZ  Order #:    [id number]                    Reading MD:    Measurements  Intervals                                Axis  Rate:       83                           P:          13  PA:         176                          QRS:        -14  QRSD:       74                           T:          126  QT:         392  QTc:        461    Interpretive Statements  SINUS RHYTHM  EARLY PRECORDIAL R/S TRANSITION  LVH WITH SECONDARY REPOLARIZATION ABNORMALITY  INFERIOR INFARCT, OLD  Compared to ECG 2017 06:07:05  No significant changes     TROPONIN     "Collection Time: 06/11/17  1:00 PM   Result Value Ref Range    Troponin I 0.06 (H) 0.00 - 0.04 ng/mL       Imaging/Procedures Review:    Independant Imaging Review: Completed  DX-CHEST-LIMITED (1 VIEW)   Final Result      Interstitial prominence may represent chronic fibrotic changes. Superimposed edema or pneumonitis not excluded.      Cardiomegaly.      ECHOCARDIOGRAM COMP W/O CONT    (Results Pending)        EKG:   EKG Independant Review: Completed  QTc: 463, HR: 95, Normal Sinus Rhythm, no ST/T changes, old inferior infarct, LVH.    Records reviewed and summarized in current documentation             Assessment/Plan     Shortness of breath (present on admission)  Assessment & Plan  -1 day history of SOB, chest \"tightness\", 3 day history of dry cough  -likely 2/2 acute exacerbation of chronic ILD due to change in environment, smoke exposure  -must r/o CHF exacerbation: history of MIs, no ECHO on file. Appears to be euvolemic with no leg swelling, slight JVD so less likely  -BNP 1087 but likely 2/2 R heart strain from chronic lung disease  -PE less likely: D-dimer 587 but Wells score 1.5 so low likelihood  -must r/o ACS: normal trop, ECG with no acute ST/T changes, less likely   Plan:   -pending ECHO   -trend troponin, ECG   -RT protocol   -O2 supplement per protocol   -IV methylprednisolone, restart home inhalers, tessalon for cough    Coronary artery disease (present on admission)  Assessment & Plan  -h/o multiple MIs, s/p 2 stents in 2013  -Troponin 0.03->0.06, mild increase likely secondary to demand ischemia. Continue trend troponin's/EKG  -ECG: Sinus, , old inferior infarct, LVH, no ST/T changes. Repeat ECG no changes.  -home meds: clopidogrel 75mg, coreg 6.25mg, lipitor 80mg. Continue here    Interstitial lung disease (CMS-HCC) (present on admission)  Assessment & Plan  -[facility]s [facility] Pulmonology at [facility]  -no O2 at home per patient  -home meds: dexamethasone 1.5mg, combivent rescue inhaler, " qvar    Hypertension  Assessment & Plan  -Stable on admission.  -Home meds: losartan 25mg. Continue here    History of GI bleed  Assessment & Plan  -Per patient's son, patient had history of GI bleed in past and was put on Protonix since then  -Stable, no evidence of acute bleed, no melena  -Continue protonix 40mg here          Anticipated Hospital stay: Observation admit        Quality Measures  EKG reviewed, Labs reviewed, Medications reviewed and Radiology images reviewed  Escalante catheter: No Escalante        DVT prophylaxis - mechanical: SCDs  Ulcer prophylaxis: Yes

## 2017-06-11 NOTE — ED NOTES
Med rec complete per S/O at bedside  Allergies reviewed  No ORAL antibiotics in last 30 days    Patient did take a medrol dose pack a couple weeks ago per S/O    Pharmacy is in [facility]

## 2017-06-11 NOTE — ASSESSMENT & PLAN NOTE
"-1 day history of SOB, chest \"tightness\", 3 day history of dry cough  -likely 2/2 acute exacerbation of chronic ILD due to change in environment, smoke exposure  -must r/o CHF exacerbation: history of HFrEF on [facility] records. Appears to be euvolemic with no leg swelling, slight JVD so less likely.  -2014 Cath: Severe cardiomyopathy with LVEF 30%, hypokinesis of entire inferior wall.  -ECHO here: LVEF 25%, grade 2 diastolic dysfunction, RVSP 60  -BNP 1087, per chart review patient had BNPs up to 999, so this may be his baseline from chronic heart strain with HF and ILD  -PE less likely: D-dimer 587 but Wells score 1.5 so low likelihood  -ACS less likely: slight trop elevations 2/2 demand ischemia, ECG x 3 with no acute ST/T changes   Plan:   -2-view CXR to assess if has pulm edema from possible HF exacerbation   -RT protocol   -O2 supplement per protocol   -prednisone 40mg PO, flovent, duoneb   -home O2 eval  "

## 2017-06-11 NOTE — ED NOTES
"Navi Morelos  77 y.o.  Chief Complaint   Patient presents with   • Chest Pain     descibed as tightness that started after arrriving in [place] from [place]   • Shortness of Breath     exsertional SOB   • Cough     dry cough       BIB EMS from [facility] for above complaint. No edema noted. Lung sounds with rales throughout. 89% RA. Hx MI. Taking Plavix. Pt A-fib on monitor. Denies current CP/SOB. PTA received nitro which relieved CP. EKG done. /84 mmHg  Pulse 93  Temp(Src) 37.1 °C (98.8 °F)  Resp 18  Ht 1.676 m (5' 6\")  Wt 68.947 kg (152 lb)  BMI 24.55 kg/m2  SpO2 96% on 2L.     "

## 2017-06-11 NOTE — IP AVS SNAPSHOT
6/14/2017    Navi Morelos  [address]  [facility] [address]    Dear Navi:    [facility] wants to ensure your discharge home is safe and you or your loved ones have had all of your questions answered regarding your care after you leave the hospital.    Below is a list of resources and contact information should you have any questions regarding your hospital stay, follow-up instructions, or active medical symptoms.    Questions or Concerns Regarding… Contact   Medical Questions Related to Your Discharge  (7 days a week, 8am-5pm) Contact a Nurse Care Coordinator   [phone]   Medical Questions Not Related to Your Discharge  (24 hours a day / 7 days a week)  Contact the Nurse Health Line   [phone]    Medications or Discharge Instructions Refer to your discharge packet   or contact your [facility] Primary Care Provider   [phone]   Follow-up Appointment(s) Schedule your appointment via [employer]   or contact Scheduling [phone]   Billing Review your statement via [employer]  or contact Billing [phone]   Medical Records Review your records via [employer]   or contact Medical Records [phone]     You may receive a telephone call within two days of discharge. This call is to make certain you understand your discharge instructions and have the opportunity to have any questions answered. You can also easily access your medical information, test results and upcoming appointments via the [employer] free online health management tool. You can learn more and sign up at [employer]. For assistance setting up your [employer] account, please call [phone].    Once again, we want to ensure your discharge home is safe and that you have a clear understanding of any next steps in your care. If you have any questions or concerns, please do not hesitate to contact us, we are here for you. Thank you for choosing [facility] for your healthcare needs.    Sincerely,    Your [facility] Healthcare Team

## 2017-06-11 NOTE — ASSESSMENT & PLAN NOTE
-follows with Pulmonology at [facility], last seen 05/2017  -PFTs 2014: FEV1 95%, FEV1/FVC: 78, TLC 77%, DLCO 58%  -no O2 at home per patient  -home meds: medrol dose seng as needed, qvar daily, combivent as rescue

## 2017-06-12 ENCOUNTER — APPOINTMENT (OUTPATIENT)
Dept: RADIOLOGY | Facility: MEDICAL CENTER | Age: 77
DRG: 197 | End: 2017-06-12
Attending: INTERNAL MEDICINE
Payer: MEDICARE

## 2017-06-12 LAB
ANION GAP SERPL CALC-SCNC: 9 MMOL/L (ref 0–11.9)
BASOPHILS # BLD AUTO: 0.5 % (ref 0–1.8)
BASOPHILS # BLD: 0.02 K/UL (ref 0–0.12)
BUN SERPL-MCNC: 16 MG/DL (ref 8–22)
CALCIUM SERPL-MCNC: 9.1 MG/DL (ref 8.5–10.5)
CHLORIDE SERPL-SCNC: 103 MMOL/L (ref 96–112)
CO2 SERPL-SCNC: 27 MMOL/L (ref 20–33)
CREAT SERPL-MCNC: 0.91 MG/DL (ref 0.5–1.4)
EKG IMPRESSION: NORMAL
EOSINOPHIL # BLD AUTO: 0.28 K/UL (ref 0–0.51)
EOSINOPHIL NFR BLD: 6.8 % (ref 0–6.9)
ERYTHROCYTE [DISTWIDTH] IN BLOOD BY AUTOMATED COUNT: 45 FL (ref 35.9–50)
GFR SERPL CREATININE-BSD FRML MDRD: >60 ML/MIN/1.73 M 2
GLUCOSE SERPL-MCNC: 120 MG/DL (ref 65–99)
HCT VFR BLD AUTO: 36.7 % (ref 42–52)
HGB BLD-MCNC: 12.1 G/DL (ref 14–18)
IMM GRANULOCYTES # BLD AUTO: 0.01 K/UL (ref 0–0.11)
IMM GRANULOCYTES NFR BLD AUTO: 0.2 % (ref 0–0.9)
LV EJECT FRACT MOD 2C 99903: 29.82
LV EJECT FRACT MOD 4C 99902: 26.34
LV EJECT FRACT MOD BP 99901: 32.38
LYMPHOCYTES # BLD AUTO: 0.87 K/UL (ref 1–4.8)
LYMPHOCYTES NFR BLD: 21.1 % (ref 22–41)
MCH RBC QN AUTO: 30.2 PG (ref 27–33)
MCHC RBC AUTO-ENTMCNC: 33 G/DL (ref 33.7–35.3)
MCV RBC AUTO: 91.5 FL (ref 81.4–97.8)
MONOCYTES # BLD AUTO: 0.46 K/UL (ref 0–0.85)
MONOCYTES NFR BLD AUTO: 11.1 % (ref 0–13.4)
NEUTROPHILS # BLD AUTO: 2.49 K/UL (ref 1.82–7.42)
NEUTROPHILS NFR BLD: 60.3 % (ref 44–72)
NRBC # BLD AUTO: 0 K/UL
NRBC BLD AUTO-RTO: 0 /100 WBC
PLATELET # BLD AUTO: 92 K/UL (ref 164–446)
PMV BLD AUTO: 10.4 FL (ref 9–12.9)
POTASSIUM SERPL-SCNC: 4.1 MMOL/L (ref 3.6–5.5)
RBC # BLD AUTO: 4.01 M/UL (ref 4.7–6.1)
SODIUM SERPL-SCNC: 139 MMOL/L (ref 135–145)
WBC # BLD AUTO: 4.1 K/UL (ref 4.8–10.8)

## 2017-06-12 PROCEDURE — 99232 SBSQ HOSP IP/OBS MODERATE 35: CPT | Mod: GC | Performed by: INTERNAL MEDICINE

## 2017-06-12 PROCEDURE — A9270 NON-COVERED ITEM OR SERVICE: HCPCS | Performed by: INTERNAL MEDICINE

## 2017-06-12 PROCEDURE — 94640 AIRWAY INHALATION TREATMENT: CPT

## 2017-06-12 PROCEDURE — 94760 N-INVAS EAR/PLS OXIMETRY 1: CPT

## 2017-06-12 PROCEDURE — 71020 DX-CHEST-2 VIEWS: CPT

## 2017-06-12 PROCEDURE — 700102 HCHG RX REV CODE 250 W/ 637 OVERRIDE(OP): Performed by: INTERNAL MEDICINE

## 2017-06-12 PROCEDURE — 93010 ELECTROCARDIOGRAM REPORT: CPT | Performed by: INTERNAL MEDICINE

## 2017-06-12 PROCEDURE — 93005 ELECTROCARDIOGRAM TRACING: CPT | Performed by: INTERNAL MEDICINE

## 2017-06-12 PROCEDURE — 80048 BASIC METABOLIC PNL TOTAL CA: CPT

## 2017-06-12 PROCEDURE — 700111 HCHG RX REV CODE 636 W/ 250 OVERRIDE (IP): Performed by: INTERNAL MEDICINE

## 2017-06-12 PROCEDURE — 770020 HCHG ROOM/CARE - TELE (206)

## 2017-06-12 PROCEDURE — 85025 COMPLETE CBC W/AUTO DIFF WBC: CPT

## 2017-06-12 PROCEDURE — 700101 HCHG RX REV CODE 250: Performed by: INTERNAL MEDICINE

## 2017-06-12 PROCEDURE — 36415 COLL VENOUS BLD VENIPUNCTURE: CPT

## 2017-06-12 RX ORDER — CARVEDILOL 3.12 MG/1
3.12 TABLET ORAL 2 TIMES DAILY WITH MEALS
Status: DISCONTINUED | OUTPATIENT
Start: 2017-06-12 | End: 2017-06-14 | Stop reason: HOSPADM

## 2017-06-12 RX ORDER — IPRATROPIUM BROMIDE AND ALBUTEROL SULFATE 2.5; .5 MG/3ML; MG/3ML
3 SOLUTION RESPIRATORY (INHALATION)
Status: DISCONTINUED | OUTPATIENT
Start: 2017-06-12 | End: 2017-06-14 | Stop reason: HOSPADM

## 2017-06-12 RX ORDER — FLUTICASONE PROPIONATE 110 UG/1
2 AEROSOL, METERED RESPIRATORY (INHALATION) EVERY 12 HOURS
Status: DISCONTINUED | OUTPATIENT
Start: 2017-06-12 | End: 2017-06-14 | Stop reason: HOSPADM

## 2017-06-12 RX ADMIN — FLUTICASONE PROPIONATE 220 MCG: 110 AEROSOL, METERED RESPIRATORY (INHALATION) at 21:00

## 2017-06-12 RX ADMIN — LOSARTAN POTASSIUM 25 MG: 25 TABLET, FILM COATED ORAL at 08:20

## 2017-06-12 RX ADMIN — CARVEDILOL 6.25 MG: 6.25 TABLET, FILM COATED ORAL at 08:20

## 2017-06-12 RX ADMIN — IPRATROPIUM BROMIDE AND ALBUTEROL SULFATE 3 ML: .5; 3 SOLUTION RESPIRATORY (INHALATION) at 02:19

## 2017-06-12 RX ADMIN — FLUTICASONE PROPIONATE 100 MCG: 50 SPRAY, METERED NASAL at 08:20

## 2017-06-12 RX ADMIN — FLUTICASONE PROPIONATE 220 MCG: 110 AEROSOL, METERED RESPIRATORY (INHALATION) at 07:12

## 2017-06-12 RX ADMIN — ATORVASTATIN CALCIUM 80 MG: 80 TABLET, FILM COATED ORAL at 21:25

## 2017-06-12 RX ADMIN — CLOPIDOGREL 75 MG: 75 TABLET, FILM COATED ORAL at 08:20

## 2017-06-12 RX ADMIN — PREDNISONE 40 MG: 20 TABLET ORAL at 08:20

## 2017-06-12 RX ADMIN — CARVEDILOL 3.12 MG: 3.12 TABLET, FILM COATED ORAL at 18:02

## 2017-06-12 RX ADMIN — IPRATROPIUM BROMIDE AND ALBUTEROL SULFATE 3 ML: .5; 3 SOLUTION RESPIRATORY (INHALATION) at 07:10

## 2017-06-12 ASSESSMENT — ENCOUNTER SYMPTOMS
SHORTNESS OF BREATH: 1
ORTHOPNEA: 0
NAUSEA: 0
ABDOMINAL PAIN: 0
PSYCHIATRIC NEGATIVE: 1
SPUTUM PRODUCTION: 0
CONSTIPATION: 0
FEVER: 0
DIARRHEA: 0
PALPITATIONS: 0
COUGH: 1
MYALGIAS: 0
BLOOD IN STOOL: 0
DIZZINESS: 0
HEADACHES: 0
VOMITING: 0
CHILLS: 0
DIAPHORESIS: 0

## 2017-06-12 ASSESSMENT — PAIN SCALES - GENERAL
PAINLEVEL_OUTOF10: 0
PAINLEVEL_OUTOF10: 0

## 2017-06-12 ASSESSMENT — LIFESTYLE VARIABLES: DO YOU DRINK ALCOHOL: NO

## 2017-06-12 NOTE — PROGRESS NOTES
Handoff report received. Assume patient care. Patient sitting up in bed. Wife at bedside. Patient not in pain. Patient not in distress, AAOX 4. Bed alarm is on. Safety precautions in place. Call light and personal belongings within reach. Educated to call for assistance if needed.

## 2017-06-12 NOTE — PROGRESS NOTES
Bedside report received from night RN.  Pt assessed A&Ox4, no c/o pain, pt states no sob but fine crackles noted in bases.  POC discussed with pt, all questions answered.  Pt states all needs are met.  Bed strip alarm on, bed in lowest position, call light within reach.  Will continue to monitor

## 2017-06-12 NOTE — ASSESSMENT & PLAN NOTE
-2014 Cath: Severe cardiomyopathy with LVEF 30%, hypokinesis of entire inferior wall.  -ECHO here: LVEF 25%, Grade 2 diastolic dysfunction, RVSP 60.  -BNP here 1087, previous has been 500-999  -Appears euvolemic  -Continuing home meds: coreg 6.25mg, losartan 25mg

## 2017-06-12 NOTE — PROGRESS NOTES
Received call from Dr Barajas concerning echo results.  Read back to Dr Barajas with results.  Will update Dr Perdomo.

## 2017-06-12 NOTE — PROGRESS NOTES
"                               [facility] Interval Note    Name Navi Morelos 1940   Age/Sex 77 y.o. male   MRN [id number]   Code Status Full     After 5PM or if no immediate response to page, please call for cross-coverage  Attending/Team: Dr. Thomas/Jori Call [phone] to page   1st Call - Day Intern (R1):   Dr. Perdomo 2nd Call - Day Sr. Resident (R2/R3):   Dr. Uribe         Chief complaint/ reason for interval visit (Primary Diagnosis)   Acute shortness of breath in the setting of interstitial lung disease after being exposed to cigarette smoke in [employer]    Interval Problem Daily Status Update      Active Problems:      Shortness of breath POA: Yes. S/p methylprednisolone IM x1, flovent, duoneb. Improvement in symptoms with decreased SOB and decreased chest \"tightness\". Ambulates with O2, no exacerbation of symptoms.        Coronary artery disease (Chronic) POA: Yes. ECG and trops negative for ACS. Denies CP, palpitations, N/V, diaphoresis. Stable on home meds.      Interstitial lung disease (CMS-HCC) (Chronic) POA: Yes. Restarted on similar home regimen. Currently on O2 supplement.       Hypertension (Chronic) POA: Yes. Stable, currently on home meds.      History of GI bleed: Stable, no bleeding, currently on omeprazole 20mg.      Heart failure with reduced ejection fraction (CMS-HCC) (Chronic) POA: Unknown. ECHO here shows similar EF (25% vs 30% in 2014), appears euvolemic. Stable on home meds.    Resolved Problems:    * No resolved hospital problems. *      Review of Systems   Constitutional: Negative for fever, chills, malaise/fatigue and diaphoresis.   Respiratory: Positive for cough and shortness of breath. Negative for sputum production.         SOB is improved   Cardiovascular: Negative for chest pain, palpitations, orthopnea and leg swelling.        \"chest tightness\" is decreased   Gastrointestinal: Negative for nausea, vomiting, abdominal pain, diarrhea, constipation and " blood in stool.   Genitourinary: Negative for dysuria, urgency, frequency and hematuria.   Musculoskeletal: Negative for myalgias.   Neurological: Negative for dizziness and headaches.   Psychiatric/Behavioral: Negative.        Consultants/Specialty  None    Disposition  Inpatient for further medical stabilization including O2 requirements.    Quality Measures  EKG reviewed, Labs reviewed, Medications reviewed and Radiology images reviewed  Escalante catheter: No Escalante        DVT prophylaxis - mechanical: SCDs  Ulcer prophylaxis: Yes              Physical Exam       Filed Vitals:    06/12/17 0220 06/12/17 0400 06/12/17 0712 06/12/17 0820   BP:  105/68  134/77   Pulse: 78 79 77 96   Temp:  36.2 °C (97.2 °F)  36.2 °C (97.2 °F)   Resp: 16 17 18 18   Height:       Weight:       SpO2: 97% 96% 95% 98%     Body mass index is 25.28 kg/(m^2). Weight: 71 kg (156 lb 8.4 oz)  Oxygen Therapy:  Pulse Oximetry: 98 %, O2 (LPM): 2, O2 Delivery: Silicone Nasal Cannula    Physical Exam   Constitutional: He is oriented to person, place, and time and well-developed, well-nourished, and in no distress.   HENT:   Head: Normocephalic and atraumatic.   Eyes: EOM are normal.   Neck: Normal range of motion.   Cardiovascular: Normal rate, regular rhythm, normal heart sounds and intact distal pulses.    No murmur heard.  Pulmonary/Chest: Effort normal. No respiratory distress. He has no wheezes.   Improved air entry in bilateral upper lobes, bibasilar crackles present.   Abdominal: Soft. Bowel sounds are normal. He exhibits no distension. There is no tenderness.   Musculoskeletal: Normal range of motion. He exhibits no edema.   Neurological: He is alert and oriented to person, place, and time.   Skin: Skin is warm and dry.   Psychiatric: Affect normal.         Lab Data Review:      6/12/2017  1:18 PM    Recent Labs      06/11/17   0620  06/12/17   0340   SODIUM  137  139   POTASSIUM  3.8  4.1   CHLORIDE  104  103   CO2  26  27   BUN  16  16  "  CREATININE  0.89  0.91   CALCIUM  9.4  9.1       Recent Labs      06/11/17   0620  06/12/17   0340   ALTSGPT  22   --    ASTSGOT  25   --    ALKPHOSPHAT  81   --    TBILIRUBIN  1.4   --    LIPASE  37   --    GLUCOSE  122*  120*       Recent Labs      06/11/17   0620  06/12/17   0341   RBC  4.14*  4.01*   HEMOGLOBIN  12.5*  12.1*   HEMATOCRIT  37.8*  36.7*   PLATELETCT  102*  92*   PROTHROMBTM  16.1*   --    APTT  36.7*   --    INR  1.25*   --        Recent Labs      06/11/17   0620  06/12/17   0341   WBC  4.5*  4.1*   NEUTSPOLYS  61.30  60.30   LYMPHOCYTES  22.60  21.10*   MONOCYTES  8.60  11.10   EOSINOPHILS  6.90  6.80   BASOPHILS  0.40  0.50   ASTSGOT  25   --    ALTSGPT  22   --    ALKPHOSPHAT  81   --    TBILIRUBIN  1.4   --            Assessment/Plan     Shortness of breath (present on admission)  Assessment & Plan  -1 day history of SOB, chest \"tightness\", 3 day history of dry cough  -likely 2/2 acute exacerbation of chronic ILD due to change in environment, smoke exposure  -must r/o CHF exacerbation: history of HFrEF on [facility] records. Appears to be euvolemic with no leg swelling, slight JVD so less likely.  -2014 Cath: Severe cardiomyopathy with LVEF 30%, hypokinesis of entire inferior wall.  -ECHO here: LVEF 25%, grade 2 diastolic dysfunction, RVSP 60  -BNP 1087, per chart review patient had BNPs up to 999, so this may be his baseline from chronic heart strain with HF and ILD  -PE less likely: D-dimer 587 but Wells score 1.5 so low likelihood  -ACS less likely: slight trop elevations 2/2 demand ischemia, ECG x 3 with no acute ST/T changes   Plan:   -2-view CXR to assess if has pulm edema from possible HF exacerbation   -RT protocol   -O2 supplement per protocol   -prednisone 40mg PO, flovent, duoneb   -home O2 eval    Coronary artery disease (present on admission)  Assessment & Plan  -h/o multiple MIs, s/p 2 stents in 2013  -Troponin 0.03->0.06-->0.07, mild increase likely secondary to demand " ischemia.  -ECG: Sinus, , old inferior infarct, LVH, no ST/T changes. Repeat ECG x 2 no changes.  -home meds: clopidogrel 75mg, coreg 6.25mg, lipitor 80mg. Continue here    Interstitial lung disease (CMS-HCC) (present on admission)  Assessment & Plan  -follows with Pulmonology at [facility], last seen 05/2017  -PFTs 2014: FEV1 95%, FEV1/FVC: 78, TLC 77%, DLCO 58%  -no O2 at home per patient  -home meds: medrol dose seng as needed, qvar daily, combivent as rescue    Hypertension  Assessment & Plan  -Stable on admission.  -Home meds: losartan 25mg. Continue here    History of GI bleed  Assessment & Plan  -Per patient's son, patient had history of GI bleed in past and was put on Protonix since then  -Stable, no evidence of acute bleed, no melena  -Continue omeprazole 20mg qd while here    Heart failure with reduced ejection fraction (CMS-HCC)  Assessment & Plan  -2014 Cath: Severe cardiomyopathy with LVEF 30%, hypokinesis of entire inferior wall.  -ECHO here: LVEF 25%, Grade 2 diastolic dysfunction, RVSP 60.  -BNP here 1087, previous has been 500-999  -Appears euvolemic  -Continuing home meds: coreg 6.25mg, losartan 25mg

## 2017-06-13 LAB
ALBUMIN SERPL BCP-MCNC: 3.9 G/DL (ref 3.2–4.9)
ALBUMIN/GLOB SERPL: 1.4 G/DL
ALP SERPL-CCNC: 73 U/L (ref 30–99)
ALT SERPL-CCNC: 17 U/L (ref 2–50)
ANION GAP SERPL CALC-SCNC: 8 MMOL/L (ref 0–11.9)
AST SERPL-CCNC: 17 U/L (ref 12–45)
BASOPHILS # BLD AUTO: 0.1 % (ref 0–1.8)
BASOPHILS # BLD: 0.01 K/UL (ref 0–0.12)
BILIRUB SERPL-MCNC: 1.4 MG/DL (ref 0.1–1.5)
BUN SERPL-MCNC: 20 MG/DL (ref 8–22)
CALCIUM SERPL-MCNC: 9.6 MG/DL (ref 8.5–10.5)
CHLORIDE SERPL-SCNC: 102 MMOL/L (ref 96–112)
CO2 SERPL-SCNC: 27 MMOL/L (ref 20–33)
CREAT SERPL-MCNC: 0.88 MG/DL (ref 0.5–1.4)
EOSINOPHIL # BLD AUTO: 0.09 K/UL (ref 0–0.51)
EOSINOPHIL NFR BLD: 1.2 % (ref 0–6.9)
ERYTHROCYTE [DISTWIDTH] IN BLOOD BY AUTOMATED COUNT: 43.8 FL (ref 35.9–50)
GFR SERPL CREATININE-BSD FRML MDRD: >60 ML/MIN/1.73 M 2
GLOBULIN SER CALC-MCNC: 2.8 G/DL (ref 1.9–3.5)
GLUCOSE SERPL-MCNC: 116 MG/DL (ref 65–99)
HCT VFR BLD AUTO: 38.5 % (ref 42–52)
HGB BLD-MCNC: 12.6 G/DL (ref 14–18)
IMM GRANULOCYTES # BLD AUTO: 0.04 K/UL (ref 0–0.11)
IMM GRANULOCYTES NFR BLD AUTO: 0.6 % (ref 0–0.9)
LYMPHOCYTES # BLD AUTO: 1.16 K/UL (ref 1–4.8)
LYMPHOCYTES NFR BLD: 16 % (ref 22–41)
MCH RBC QN AUTO: 29.7 PG (ref 27–33)
MCHC RBC AUTO-ENTMCNC: 32.7 G/DL (ref 33.7–35.3)
MCV RBC AUTO: 90.8 FL (ref 81.4–97.8)
MONOCYTES # BLD AUTO: 0.7 K/UL (ref 0–0.85)
MONOCYTES NFR BLD AUTO: 9.6 % (ref 0–13.4)
NEUTROPHILS # BLD AUTO: 5.27 K/UL (ref 1.82–7.42)
NEUTROPHILS NFR BLD: 72.5 % (ref 44–72)
NRBC # BLD AUTO: 0 K/UL
NRBC BLD AUTO-RTO: 0 /100 WBC
PLATELET # BLD AUTO: 121 K/UL (ref 164–446)
PMV BLD AUTO: 10.7 FL (ref 9–12.9)
POTASSIUM SERPL-SCNC: 4 MMOL/L (ref 3.6–5.5)
PROT SERPL-MCNC: 6.7 G/DL (ref 6–8.2)
RBC # BLD AUTO: 4.24 M/UL (ref 4.7–6.1)
SODIUM SERPL-SCNC: 137 MMOL/L (ref 135–145)
WBC # BLD AUTO: 7.3 K/UL (ref 4.8–10.8)

## 2017-06-13 PROCEDURE — 700111 HCHG RX REV CODE 636 W/ 250 OVERRIDE (IP): Performed by: INTERNAL MEDICINE

## 2017-06-13 PROCEDURE — A9270 NON-COVERED ITEM OR SERVICE: HCPCS | Performed by: INTERNAL MEDICINE

## 2017-06-13 PROCEDURE — 700102 HCHG RX REV CODE 250 W/ 637 OVERRIDE(OP): Performed by: INTERNAL MEDICINE

## 2017-06-13 PROCEDURE — 36415 COLL VENOUS BLD VENIPUNCTURE: CPT

## 2017-06-13 PROCEDURE — 80053 COMPREHEN METABOLIC PANEL: CPT

## 2017-06-13 PROCEDURE — 770020 HCHG ROOM/CARE - TELE (206)

## 2017-06-13 PROCEDURE — 99232 SBSQ HOSP IP/OBS MODERATE 35: CPT | Mod: GC | Performed by: INTERNAL MEDICINE

## 2017-06-13 PROCEDURE — 85025 COMPLETE CBC W/AUTO DIFF WBC: CPT

## 2017-06-13 RX ORDER — DOXYCYCLINE HYCLATE 100 MG
100 TABLET ORAL 2 TIMES DAILY
Qty: 10 TAB | Refills: 0 | Status: SHIPPED | OUTPATIENT
Start: 2017-06-13 | End: 2017-06-18

## 2017-06-13 RX ORDER — PREDNISONE 10 MG/1
10 TABLET ORAL DAILY
Qty: 7 TAB | Refills: 0 | Status: SHIPPED | OUTPATIENT
Start: 2017-06-13 | End: 2017-06-18

## 2017-06-13 RX ORDER — DOXYCYCLINE HYCLATE 100 MG
100 TABLET ORAL 2 TIMES DAILY
Qty: 10 TAB | Refills: 0 | Status: SHIPPED | OUTPATIENT
Start: 2017-06-13 | End: 2017-06-13

## 2017-06-13 RX ORDER — PREDNISONE 10 MG/1
30 TABLET ORAL DAILY
Status: DISCONTINUED | OUTPATIENT
Start: 2017-06-14 | End: 2017-06-14 | Stop reason: HOSPADM

## 2017-06-13 RX ORDER — DOXYCYCLINE 100 MG/1
100 TABLET ORAL EVERY 12 HOURS
Status: DISCONTINUED | OUTPATIENT
Start: 2017-06-13 | End: 2017-06-14 | Stop reason: HOSPADM

## 2017-06-13 RX ORDER — BENZONATATE 100 MG/1
100 CAPSULE ORAL 3 TIMES DAILY PRN
Qty: 20 CAP | Refills: 0 | Status: SHIPPED | OUTPATIENT
Start: 2017-06-13

## 2017-06-13 RX ADMIN — FLUTICASONE PROPIONATE 220 MCG: 110 AEROSOL, METERED RESPIRATORY (INHALATION) at 20:40

## 2017-06-13 RX ADMIN — CARVEDILOL 3.12 MG: 3.12 TABLET, FILM COATED ORAL at 08:12

## 2017-06-13 RX ADMIN — LOSARTAN POTASSIUM 25 MG: 25 TABLET, FILM COATED ORAL at 08:12

## 2017-06-13 RX ADMIN — PREDNISONE 40 MG: 20 TABLET ORAL at 08:13

## 2017-06-13 RX ADMIN — CARVEDILOL 3.12 MG: 3.12 TABLET, FILM COATED ORAL at 18:22

## 2017-06-13 RX ADMIN — ATORVASTATIN CALCIUM 80 MG: 80 TABLET, FILM COATED ORAL at 20:40

## 2017-06-13 RX ADMIN — FLUTICASONE PROPIONATE 100 MCG: 50 SPRAY, METERED NASAL at 08:10

## 2017-06-13 RX ADMIN — CLOPIDOGREL 75 MG: 75 TABLET, FILM COATED ORAL at 08:12

## 2017-06-13 RX ADMIN — DOXYCYCLINE 100 MG: 100 TABLET ORAL at 20:40

## 2017-06-13 RX ADMIN — FLUTICASONE PROPIONATE 220 MCG: 110 AEROSOL, METERED RESPIRATORY (INHALATION) at 08:11

## 2017-06-13 RX ADMIN — OMEPRAZOLE 20 MG: 20 CAPSULE, DELAYED RELEASE ORAL at 08:12

## 2017-06-13 ASSESSMENT — PAIN SCALES - GENERAL
PAINLEVEL_OUTOF10: 0

## 2017-06-13 NOTE — DISCHARGE PLANNING
[facility] received a DME choice form. Per the choice form the referral has been sent to [facility]

## 2017-06-13 NOTE — DISCHARGE PLANNING
Medical Social Work    SW received PC from Kenan Vaughn CM. Pt does have DME insurance benefit. Per Johana, O2 order will be sent to [facility] today. KARINA spoke with pt's son to see if someone would be available to receive equipment at home address. Son stated no one will be home until midnight tonight. SW stated [facility] may not be able to deliver at that time and pt's O2 tanks will run out if equipment isn't delivered, Son agreed pt will have to stay another night and plan for DC tomorrow early morning so that equipment can be delivered once pt arrives home. Son requested a phone call at [phone] to be informed on what time pt will dc.

## 2017-06-13 NOTE — DISCHARGE PLANNING
Medical Social Work    SW met with pt and pt's wife at bedside. Pt and pt's wife made choice of [facility] for Home O2. Family resides in [place], [place], which is near [place], [place].

## 2017-06-13 NOTE — DISCHARGE PLANNING
Medical Social Work    KARINA informed by LATONIA Negron that pt's insurance will not cover the O2 order unless his [facility]  orders it. KARINA contacted [facility] in [place], [place], at ph# [phone], and left a VM for their continuing care worker explaining above information. KARINA advised that pt cannot d/c without his home O2, so the request should be deemed urgent in nature. KARINA left both Tele 7 Care Coordination extensions and requested a call back.

## 2017-06-13 NOTE — PROGRESS NOTES
Handoff report received. Assume patient care. Patient sitting up in chair. Patient not   in pain. Patient not in distress, AAOX 4. Transport is here to take Patient to xray.

## 2017-06-13 NOTE — PROGRESS NOTES
Pt has been waiting all day for oxygen.  working with [facility]  to get the O2. Family member wants to talk to Dr. Perdomo. I have Paged him twice. Pt is AOX4, 1L of O2, VSS, SR,  Tolerates diet. Pt wants to leave.

## 2017-06-13 NOTE — PROGRESS NOTES
Pt ambulated around unit, room at still maintains 90-92% while at rest but will drop to 83-84 with ambulation
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
## 2017-06-13 NOTE — DISCHARGE PLANNING
Medical Social Work    KARINA spoke with pt's [facility] who stated she was looking to see if pt had DME benefit on his insurance plan. CM to call KARINA back with a response.

## 2017-06-13 NOTE — PROGRESS NOTES
Bedside report received. Patient A&O X 3, tele- SR 6 beats of V-tach,  2L NC,  Voids- urinal/bathroom, Activity- independent, Diet- cardiac. Complains of pain 0/10 medicated per MAR. POC discussed with patient. Pt verbalized understanding. Call light and belongings with in reach.  Bed locked and in lowest position, alarm and fall precautions in place.

## 2017-06-13 NOTE — PROGRESS NOTES
MS    SR 67-87    Frequent PACs, Rare PVCs, and 5 beats of V-tach (pt was sleeping and asymptomatic)    0.18/0.08/0.36

## 2017-06-13 NOTE — PROGRESS NOTES
No changes in pt status, AOX4,  1L NC, tele/HR - SR, voids- bathroom, tolerates cardiac diet.  0/10 Pain. Reviewed plan of care, continue to monitor vitals and manage pain. Test for O2 need performed.

## 2017-06-13 NOTE — FACE TO FACE
Face to Face Note  -  Durable Medical Equipment    Jose Perdomo M.D. - NPI: [id number]  I certify that this patient is under my care and that they had a durable medical equipment(DME)face to face encounter by myself that meets the physician DME face-to-face encounter requirements with this patient on:    Date of encounter:   Patient:                    MRN:                       YOB: 2017  Navi Morelos  [id number]  1940     The encounter with the patient was in whole, or in part, for the following medical condition, which is the primary reason for durable medical equipment:  CHF    I certify that, based on my findings, the following durable medical equipment is medically necessary:  Oxygen.    HOME O2 Saturation Measurements:(Values must be present for Home Oxygen orders)  Room air sat at rest: 91  Room air sat with amb: 84  With liters of O2: 1, O2 sat at rest with O2: 98  With Liters of O2: 2, O2 sat with amb with O2 : 91  Is the patient mobile?: Yes    My Clinical findings support the need for the above equipment due to:  Hypoxia    Supporting Symptoms: Cough, Shortness of breath

## 2017-06-13 NOTE — HEART FAILURE PROGRAM
Late Entry:    Cardiovascular Nurse Navigator () Progress Note:     This CNN discussed pt care with BSRNJunior on the morning of 6/12/17. Pt is not on Acute HF List as was not on problems list as such this CNN was not aware of him. Pt plans to return home to [place] immediately upon DC. Per Junior, he has already provided HF education and [employer] team plan to DC pt today.    This CNN contacted pt's PCP at [facility] in [place] and was able to obtain an appt with PCP's Associate Dr. Marks on 6/15. This was entered into the follow up section and will appear on the AVS.     Main [facility] provided this CNN with Cardiology group number. This CNN phoned the cardiology group and spoke with . Message was sent by  for an RN to phone back to arrange f/u appt. Advised that they phone the patient back as he is discharging.    PCP appointment is adequate for HF f/u.     PLEASE DO NOT SCHEDULE PT WITH [facility] UNLESS HE CHANGES HIS PLANS AND DECIDES TO STAY IN [place].    Thank you  Please call with questions.

## 2017-06-13 NOTE — CARE PLAN
Problem: Infection  Goal: Will remain free from infection  Intervention: Implement standard precautions and perform hand washing before and after patient contact  RN will follow protocols and necessary steps to minimize the spread of infection. RN educated pt and and any visitors on proper hand hygiene.       Problem: Knowledge Deficit  Goal: Knowledge of disease process/condition, treatment plan, diagnostic tests, and medications will improve  Intervention: Assess knowledge level of disease process/condition, treatment plan, diagnostic tests, and medications  Patient educated on plan of care, medications, pain management, and disease processes.. Will continue to answer questions regarding medication administration, care, procedures, and upcoming tests.

## 2017-06-14 VITALS
DIASTOLIC BLOOD PRESSURE: 53 MMHG | TEMPERATURE: 98.1 F | SYSTOLIC BLOOD PRESSURE: 88 MMHG | OXYGEN SATURATION: 97 % | BODY MASS INDEX: 24.48 KG/M2 | HEART RATE: 91 BPM | RESPIRATION RATE: 19 BRPM | WEIGHT: 152.34 LBS | HEIGHT: 66 IN

## 2017-06-14 PROCEDURE — 700102 HCHG RX REV CODE 250 W/ 637 OVERRIDE(OP): Performed by: STUDENT IN AN ORGANIZED HEALTH CARE EDUCATION/TRAINING PROGRAM

## 2017-06-14 PROCEDURE — A9270 NON-COVERED ITEM OR SERVICE: HCPCS | Performed by: STUDENT IN AN ORGANIZED HEALTH CARE EDUCATION/TRAINING PROGRAM

## 2017-06-14 PROCEDURE — 700111 HCHG RX REV CODE 636 W/ 250 OVERRIDE (IP): Performed by: INTERNAL MEDICINE

## 2017-06-14 PROCEDURE — 700102 HCHG RX REV CODE 250 W/ 637 OVERRIDE(OP): Performed by: INTERNAL MEDICINE

## 2017-06-14 PROCEDURE — A9270 NON-COVERED ITEM OR SERVICE: HCPCS | Performed by: INTERNAL MEDICINE

## 2017-06-14 PROCEDURE — 99239 HOSP IP/OBS DSCHRG MGMT >30: CPT | Mod: GC | Performed by: INTERNAL MEDICINE

## 2017-06-14 RX ORDER — TRAZODONE HYDROCHLORIDE 50 MG/1
50 TABLET ORAL ONCE
Status: COMPLETED | OUTPATIENT
Start: 2017-06-14 | End: 2017-06-14

## 2017-06-14 RX ADMIN — CARVEDILOL 3.12 MG: 3.12 TABLET, FILM COATED ORAL at 08:20

## 2017-06-14 RX ADMIN — LOSARTAN POTASSIUM 25 MG: 25 TABLET, FILM COATED ORAL at 08:20

## 2017-06-14 RX ADMIN — DOXYCYCLINE 100 MG: 100 TABLET ORAL at 09:49

## 2017-06-14 RX ADMIN — FLUTICASONE PROPIONATE 100 MCG: 50 SPRAY, METERED NASAL at 08:23

## 2017-06-14 RX ADMIN — TRAZODONE HYDROCHLORIDE 50 MG: 50 TABLET ORAL at 00:18

## 2017-06-14 RX ADMIN — OMEPRAZOLE 20 MG: 20 CAPSULE, DELAYED RELEASE ORAL at 08:20

## 2017-06-14 RX ADMIN — FLUTICASONE PROPIONATE 220 MCG: 110 AEROSOL, METERED RESPIRATORY (INHALATION) at 08:23

## 2017-06-14 RX ADMIN — CLOPIDOGREL 75 MG: 75 TABLET, FILM COATED ORAL at 08:20

## 2017-06-14 RX ADMIN — PREDNISONE 30 MG: 20 TABLET ORAL at 08:20

## 2017-06-14 ASSESSMENT — PAIN SCALES - GENERAL
PAINLEVEL_OUTOF10: 0

## 2017-06-14 NOTE — PROGRESS NOTES
SBP 88. DR PERDOMO PAGED TO BE MADE AWARE. PATIENT ASYMPTOMATIC. SAFETY MAINTAINED.       12:30 Dr Perdomo returned this RN page- aware of patient's most recent VS. No new orders

## 2017-06-14 NOTE — DISCHARGE PLANNING
Medical Social Work    KARINA called [employer] who stated an order has not yet been placed for pt's O2. KARINA called CARISSA Vaughn again who stated she was following up with [employer] regarding the order that was sent yesterday.

## 2017-06-14 NOTE — PROGRESS NOTES
Bedside report received. Patient care assumed. No s/s of distress. Call light within reach. Safety maintained.  SW called regarding home O2. KARINA will follow-up with [place] to check status of o2.

## 2017-06-14 NOTE — PROGRESS NOTES
Received report from Zohra RN and bedside. Pt is A&OX4 and is sitting up in bed with family. Pt verbalizing no complaints at this time. Per report pt was to be DC but home oxygen is still not set up. Pt will be kept for another night and hopefully dc in the am when oxygen is setup. Pt has non slip socks applied, bed in lowest position, and call light/belongings within reach.

## 2017-06-14 NOTE — DISCHARGE PLANNING
Medical Social Work    KARINA spoke with pt's son Dr Real and updated on O2 pending. Dr. Real became frustrated but expressed understanding regarding O2 process with [facility] pt's. Dr. Real requested update as soon as KARINA hears back from [facility] regarding O2.

## 2017-06-14 NOTE — CARE PLAN
Problem: Respiratory:  Goal: Respiratory status will improve  Outcome: PROGRESSING SLOWER THAN EXPECTED  Intervention: Assess and monitor pulmonary status    06/13/17 0800 06/13/17 2000 06/13/17 2200   OTHER   Respiration --  17 --    Pulse Oximetry --  96 % --    O2 (LPM) --  0 --    Work Of Breathing / Effort --  --  Mild   Pre/Post Intervention Pre Intervention Assessment --  --    RUL Breath Sounds --  --  Clear   RML Breath Sounds --  --  Clear   RLL Breath Sounds --  --  Fine Crackles   MARYA Breath Sounds --  --  Clear   LLL Breath Sounds --  --  Fine Crackles   Respiratory   Cough --  --  Non Productive       Intervention: Administer and titrate oxygen therapy    06/13/17 2229   OTHER   O2 (LPM) 1

## 2017-06-14 NOTE — PROGRESS NOTES
"                               [facility] Internal Medicine Interval Note    Name Navi Morelos       1940   Age/Sex 77 y.o. male   MRN [id number]   Code Status Full Code     After 5PM or if no immediate response to page, please call for cross-coverage  Attending/Team: Dr. Thomas/Jori Call [phone] to page   1st Call - Day Intern (R1):   Dr. Perdomo 2nd Call - Day Sr. Resident (R2/R3):   Dr. Uribe         Chief complaint/ reason for interval visit (Primary Diagnosis)   Acute shortness of breath in the setting of interstitial lung disease after being exposed to cigarette smoke in [facility]'s    Interval Problem Daily Status Update    Patient failed 6 minute walk test, needs home 02 order placed, D/C home tomorrow as long as he has home oxygen set up.      Shortness of breath: Stable on oxygen, denies SOB while on oxygen, failed 6 minute walking eval with desaturations down to 83-84%, attempted to call patient's pulmonologist office multiple time without success.    Coronary artery disease: No new symptoms, Stable on home meds.    Interstitial lung disease: Restarted on similar home regimen, send home on oxygen.    Hypertension: Stable, currently on home meds, some non-sustained tachycardia in 150's on ambulation.    History of GI bleed: Stable, no bleeding, currently on omeprazole 20mg.    Heart failure with reduced ejection fraction: ECHO here shows similar EF (25% vs 30% in 2014), appears euvolemic. Stable on home meds.        ROS  Constitutional: Negative for fever, chills, malaise/fatigue and diaphoresis.   Respiratory: Positive for cough and shortness of breath. Negative for sputum production.          SOB is improved   Cardiovascular: Negative for chest pain, palpitations, orthopnea and leg swelling.         \"chest tightness\" is decreased   Gastrointestinal: Negative for nausea, vomiting, abdominal pain, diarrhea, constipation and blood in stool.   Genitourinary: Negative for dysuria, urgency, frequency and " hematuria.   Musculoskeletal: Negative for myalgias.   Neurological: Negative for dizziness and headaches.   Psychiatric/Behavioral: Negative.        Consultants/Specialty  None    Disposition  Inpatient for discharge with home oxygen    Core Measures  EKG reviewed, Labs reviewed, Medications reviewed and Radiology images reviewed  Escalante catheter: No Escalante  DVT prophylaxis - mechanical: SCDs  Ulcer prophylaxis: Yes      Physical Exam       Filed Vitals:    06/13/17 0400 06/13/17 0750 06/13/17 1157 06/13/17 1611   BP: 111/66 111/68 99/60 108/72   Pulse: 73 64 80 93   Temp: 36.7 °C (98.1 °F) 36.9 °C (98.4 °F) 37 °C (98.6 °F) 37.1 °C (98.7 °F)   Resp: 16 16 19 19   Height:       Weight:       SpO2: 94% 94% 97% 92%     Body mass index is 24.42 kg/(m^2). Weight: 68.6 kg (151 lb 3.8 oz)  Oxygen Therapy:  Pulse Oximetry: 92 %, O2 (LPM): 1, O2 Delivery: Silicone Nasal Cannula    Physical Exam  Constitutional: He is oriented to person, place, and time and well-developed, well-nourished, and in no distress.   HENT:    Head: Normocephalic and atraumatic.   Eyes: EOM are normal.   Neck: Normal range of motion.   Cardiovascular: Normal rate, regular rhythm, normal heart sounds and intact distal pulses.     No murmur heard.  Pulmonary/Chest: Effort normal. No respiratory distress. He has no wheezes.   Improved air entry in bilateral upper lobes, bibasilar crackles present.   Abdominal: Soft. Bowel sounds are normal. He exhibits no distension. There is no tenderness.   Musculoskeletal: Normal range of motion. He exhibits no edema.   Neurological: He is alert and oriented to person, place, and time.   Skin: Skin is warm and dry.   Psychiatric: Affect normal.     Lab Data Review:      6/13/2017  5:15 PM    Recent Labs      06/11/17   0620  06/12/17   0340  06/13/17   0321   SODIUM  137  139  137   POTASSIUM  3.8  4.1  4.0   CHLORIDE  104  103  102   CO2  26  27  27   BUN  16  16  20   CREATININE  0.89  0.91  0.88   CALCIUM  9.4  9.1  " 9.6       Recent Labs      06/11/17   0620  06/12/17   0340  06/13/17   0321   ALTSGPT  22   --   17   ASTSGOT  25   --   17   ALKPHOSPHAT  81   --   73   TBILIRUBIN  1.4   --   1.4   LIPASE  37   --    --    GLUCOSE  122*  120*  116*       Recent Labs      06/11/17   0620  06/12/17   0341  06/13/17   0321   RBC  4.14*  4.01*  4.24*   HEMOGLOBIN  12.5*  12.1*  12.6*   HEMATOCRIT  37.8*  36.7*  38.5*   PLATELETCT  102*  92*  121*   PROTHROMBTM  16.1*   --    --    APTT  36.7*   --    --    INR  1.25*   --    --        Recent Labs      06/11/17   0620 06/12/17 0341 06/13/17   0321   WBC  4.5*  4.1*  7.3   NEUTSPOLYS  61.30  60.30  72.50*   LYMPHOCYTES  22.60  21.10*  16.00*   MONOCYTES  8.60  11.10  9.60   EOSINOPHILS  6.90  6.80  1.20   BASOPHILS  0.40  0.50  0.10   ASTSGOT  25   --   17   ALTSGPT  22   --   17   ALKPHOSPHAT  81   --   73   TBILIRUBIN  1.4   --   1.4           Assessment/Plan     Shortness of breath (present on admission)  Assessment & Plan  -1 day history of SOB, chest \"tightness\", 3 day history of dry cough  -likely 2/2 acute exacerbation of chronic ILD due to change in environment, smoke exposure  -must r/o CHF exacerbation: history of HFrEF on [facility] records. Appears to be euvolemic with no leg swelling, slight JVD so less likely.  -2014 Cath: Severe cardiomyopathy with LVEF 30%, hypokinesis of entire inferior wall.  -ECHO here: LVEF 25%, grade 2 diastolic dysfunction, RVSP 60  -BNP 1087, per chart review patient had BNPs up to 999, so this may be his baseline from chronic heart strain with HF and ILD  -PE less likely: D-dimer 587 but Wells score 1.5 so low likelihood  -ACS less likely: slight trop elevations 2/2 demand ischemia, ECG x 3 with no acute ST/T changes   Plan:   -2-view CXR to assess if has pulm edema from possible HF exacerbation   -RT protocol   -O2 supplement per protocol   -prednisone 40mg PO, flovent, duoneb   -home O2 eval    Coronary artery disease (present on " admission)  Assessment & Plan  -h/o multiple MIs, s/p 2 stents in 2013  -Troponin 0.03->0.06-->0.07, mild increase likely secondary to demand ischemia.  -ECG: Sinus, , old inferior infarct, LVH, no ST/T changes. Repeat ECG x 2 no changes.  -home meds: clopidogrel 75mg, coreg 6.25mg, lipitor 80mg. Continue here    Interstitial lung disease (CMS-HCC) (present on admission)  Assessment & Plan  -follows with Pulmonology at [facility], last seen 05/2017  -PFTs 2014: FEV1 95%, FEV1/FVC: 78, TLC 77%, DLCO 58%  -no O2 at home per patient  -home meds: medrol dose seng as needed, qvar daily, combivent as rescue    Hypertension (present on admission)  Assessment & Plan  -Stable on admission.  -Home meds: losartan 25mg. Continue here    History of GI bleed (present on admission)  Assessment & Plan  -Per patient's son, patient had history of GI bleed in past and was put on Protonix since then  -Stable, no evidence of acute bleed, no melena  -Continue omeprazole 20mg qd while here    Heart failure with reduced ejection fraction (CMS-HCC) (present on admission)  Assessment & Plan  -2014 Cath: Severe cardiomyopathy with LVEF 30%, hypokinesis of entire inferior wall.  -ECHO here: LVEF 25%, Grade 2 diastolic dysfunction, RVSP 60.  -BNP here 1087, previous has been 500-999  -Appears euvolemic  -Continuing home meds: coreg 6.25mg, losartan 25mg

## 2017-06-14 NOTE — DISCHARGE PLANNING
Medical Social Work    KARINA spoke with CARISSA Vaughn who stated she is waiting for [employer] company to approve pt's O2. Johana also stated DME company has to approve two tanks to get pt back home and wanted to know how long pt's drive will be and if someone will be home to receive equipment.     KARINA spoke to Dr. Real and relayed information and confirmed someone will be home to receive equipment today at 2:00 and the drive will be about 2.5 hours. Information relayed to Johana.

## 2017-06-14 NOTE — DISCHARGE INSTRUCTIONS
Discharge Instructions    Discharged to home by car with relative. Discharged via wheelchair, hospital escort: Yes.  Special equipment needed: Oxygen    Be sure to schedule a follow-up appointment with your primary care doctor or any specialists as instructed.     Discharge Plan:   Influenza Vaccine Indication: Not indicated: Previously immunized this influenza season and > 8 years of age    I understand that a diet low in cholesterol, fat, and sodium is recommended for good health. Unless I have been given specific instructions below for another diet, I accept this instruction as my diet prescription.   Other diet:     Special Instructions: None    · Is patient discharged on Warfarin / Coumadin?   No     · Is patient Post Blood Transfusion?  No    Depression / Suicide Risk    As you are discharged from this [facility] facility, it is important to learn how to keep safe from harming yourself.    Recognize the warning signs:  · Abrupt changes in personality, positive or negative- including increase in energy   · Giving away possessions  · Change in eating patterns- significant weight changes-  positive or negative  · Change in sleeping patterns- unable to sleep or sleeping all the time   · Unwillingness or inability to communicate  · Depression  · Unusual sadness, discouragement and loneliness  · Talk of wanting to die  · Neglect of personal appearance   · Rebelliousness- reckless behavior  · Withdrawal from people/activities they love  · Confusion- inability to concentrate     If you or a loved one observes any of these behaviors or has concerns about self-harm, here's what you can do:  · Talk about it- your feelings and reasons for harming yourself  · Remove any means that you might use to hurt yourself (examples: pills, rope, extension cords, firearm)  · Get professional help from the community (Mental Health, Substance Abuse, psychological counseling)  · Do not be alone:Call your Safe Contact- someone whom you  trust who will be there for you.  · Call your local CRISIS HOTLINE 145-0444 or 771-815-4004  · Call your local Children's Mobile Crisis Response Team [place] (050) 914-8962 or www.Stazoo.com  · Call the toll free National Suicide Prevention Hotlines   · National Suicide Prevention Lifeline 053-824-QUDV (1008)  · [employer] Hope Line Network 800-SUICIDE (973-5692)    Heart Failure    Heart failure means your heart has trouble pumping blood. This makes it hard for your body to work well. Heart failure is usually a long-term (chronic) condition. You must take good care of yourself and follow your doctor's treatment plan.    HOME CARE  · Take your heart medicine as told by your doctor.  ¨ Do not stop taking medicine unless your doctor tells you to.  ¨ Do not skip any dose of medicine.  ¨ Refill your medicines before they run out.  ¨ Take other medicines only as told by your doctor or pharmacist.  · Stay active if told by your doctor. The elderly and people with severe heart failure should talk with a doctor about physical activity.  · Eat heart-healthy foods. Choose foods that are without trans fat and are low in saturated fat, cholesterol, and salt (sodium). This includes fresh or frozen fruits and vegetables, fish, lean meats, fat-free or low-fat dairy foods, whole grains, and high-fiber foods. Lentils and dried peas and beans (legumes) are also good choices.  · Limit salt if told by your doctor.  · Cook in a healthy way. Roast, grill, broil, bake, poach, steam, or stir-thurston foods.  · Limit fluids as told by your doctor.  · Weigh yourself every morning. Do this after you pee (urinate) and before you eat breakfast. Write down your weight to give to your doctor.  · Take your blood pressure and write it down if your doctor tells you to.  · Ask your doctor how to check your pulse. Check your pulse as told.  · Lose weight if told by your doctor.  · Stop smoking or chewing tobacco. Do not use gum or patches that  help you quit without your doctor's approval.  · Schedule and go to doctor visits as told.  · Nonpregnant women should have no more than 1 drink a day. Men should have no more than 2 drinks a day. Talk to your doctor about drinking alcohol.  · Stop illegal drug use.  · Stay current with shots (immunizations).  · Manage your health conditions as told by your doctor.  · Learn to manage your stress.  · Rest when you are tired.  · If it is really hot outside:  ¨ Avoid intense activities.  ¨ Use air conditioning or fans, or get in a cooler place.  ¨ Avoid caffeine and alcohol.  ¨ Wear loose-fitting, lightweight, and light-colored clothing.  · If it is really cold outside:  ¨ Avoid intense activities.  ¨ Layer your clothing.  ¨ Wear mittens or gloves, a hat, and a scarf when going outside.  ¨ Avoid alcohol.  · Learn about heart failure and get support as needed.  · Get help to maintain or improve your quality of life and your ability to care for yourself as needed.  GET HELP IF:   · You gain weight quickly.  · You are more short of breath than usual.  · You cannot do your normal activities.  · You tire easily.  · You cough more than normal, especially with activity.  · You have any or more puffiness (swelling) in areas such as your hands, feet, ankles, or belly (abdomen).  · You cannot sleep because it is hard to breathe.  · You feel like your heart is beating fast (palpitations).  · You get dizzy or light-headed when you stand up.  GET HELP RIGHT AWAY IF:   · You have trouble breathing.  · There is a change in mental status, such as becoming less alert or not being able to focus.  · You have chest pain or discomfort.  · You faint.  MAKE SURE YOU:   · Understand these instructions.  · Will watch your condition.  · Will get help right away if you are not doing well or get worse.     This information is not intended to replace advice given to you by your health care provider. Make sure you discuss any questions you have  with your health care provider.     Document Released: 09/26/2009 Document Revised: 01/08/2016 Document Reviewed: 02/03/2014  Elsevier Interactive Patient Education ©2016 [employer]

## 2017-06-14 NOTE — DISCHARGE PLANNING
Medical Social Work    KARINA received call from Michelle from [facility] who stated referral has been accepted and gave 1-2 hour time frame for O2 to be delivered. SW called Dr. Real and updated.

## 2017-06-14 NOTE — PROGRESS NOTES
Pt had 4 beats of V tach  Pt was stable. Asymptomatic.  Pt had a hx of 5 beat of v tach previously

## 2017-06-14 NOTE — PROGRESS NOTES
Called on call MD. Pt requesting a sleeping aid. Dr. Trevino called back and placed order in computer.

## 2017-06-15 ENCOUNTER — PATIENT OUTREACH (OUTPATIENT)
Dept: HEALTH INFORMATION MANAGEMENT | Facility: OTHER | Age: 77
End: 2017-06-15

## 2017-06-15 NOTE — PROGRESS NOTES
· Placed discharge outreach phone call to patient s/p hospital discharge 6/14/17.  Left voicemail with my contact information and instructions to return my phone call.

## 2017-06-15 NOTE — PROGRESS NOTES
Discharge summary has already been signed by attending and I am unable to addend it further. I was asked to comment on why the patient was not discharged on spironolactone. Mr. Morelos was not admitted with congestive heart failure but rather an exacerbation of his interstitial lung disease. Spironolactone is indicated in patients who are symptomatic with LVEF <35%, post STEMI with LEVEF<40%, symptomatic heart failure or DM. Although Mr. Morelos has an EF 25% he was asymptomatic as far as his heart failure is concerned (heart failure was not the cause of his symptoms) and as such he did not meet any of this criteria for administration of spironolactone. Patient had previously been seen by a cardiologist in 2014 s/p MI with an EF 30% so this finding was not new and his cardiologist was already aware. At that time spironolactone was not initiated. Furthermore, Mr. 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 Patient's left sided heart failure was stable and largely unchanged since 2014. On admit and during his hospitalization he was euvolemic/borderline hypovolemic with no fluid in his lungs (Chest xray improved with steroids and not diuretics which were not given) indicating stable left sided heart disease and no peripheral edema. Mr. Morelos's CHF is NYHA class II further disqualifying him for spironolactone initiation as it is only indicated in NYHA class III/IV. Spironolactone initiation requires close follow up to monitor kidney function and potassium levels and seeing as how patient lived in [place] this would not be possible for a prescriber from this institution to do so. Patient's cardiology office was contacted and asked to see the patient and they agreed to fit him in soon and stated that they  would call him with an appointment for proper cardiac follow up. At that time if spironolactone is decided to be used it is much more appropriate for continuity of care if his cardiologist assesses the need and prescribes it as this medication was not indicated or believed to benefit the patient emergently during this hospitalization.

## 2017-06-17 ENCOUNTER — PATIENT OUTREACH (OUTPATIENT)
Dept: HEALTH INFORMATION MANAGEMENT | Facility: OTHER | Age: 77
End: 2017-06-17

## 2017-06-17 NOTE — PROGRESS NOTES
06/17/2017 0905 - Discharge Outreach - Received VM from patient's wife - attempted to call back x 4 - Line busy first 3 times and on 4th attempt - unable to LM